# Patient Record
Sex: FEMALE | Race: WHITE | Employment: FULL TIME | URBAN - METROPOLITAN AREA
[De-identification: names, ages, dates, MRNs, and addresses within clinical notes are randomized per-mention and may not be internally consistent; named-entity substitution may affect disease eponyms.]

---

## 2017-11-20 PROBLEM — J30.89 CHRONIC NON-SEASONAL ALLERGIC RHINITIS: Status: ACTIVE | Noted: 2017-11-20

## 2018-06-28 ENCOUNTER — OFFICE VISIT (OUTPATIENT)
Dept: PRIMARY CARE CLINIC | Age: 48
End: 2018-06-28
Payer: COMMERCIAL

## 2018-06-28 VITALS
SYSTOLIC BLOOD PRESSURE: 128 MMHG | WEIGHT: 158 LBS | DIASTOLIC BLOOD PRESSURE: 80 MMHG | OXYGEN SATURATION: 97 % | TEMPERATURE: 97.9 F | HEART RATE: 83 BPM | BODY MASS INDEX: 24.75 KG/M2

## 2018-06-28 DIAGNOSIS — J01.20 ACUTE NON-RECURRENT ETHMOIDAL SINUSITIS: Primary | ICD-10-CM

## 2018-06-28 PROCEDURE — G8420 CALC BMI NORM PARAMETERS: HCPCS | Performed by: FAMILY MEDICINE

## 2018-06-28 PROCEDURE — 1036F TOBACCO NON-USER: CPT | Performed by: FAMILY MEDICINE

## 2018-06-28 PROCEDURE — G8427 DOCREV CUR MEDS BY ELIG CLIN: HCPCS | Performed by: FAMILY MEDICINE

## 2018-06-28 PROCEDURE — 99213 OFFICE O/P EST LOW 20 MIN: CPT | Performed by: FAMILY MEDICINE

## 2018-06-28 RX ORDER — AMOXICILLIN 500 MG/1
500 CAPSULE ORAL 3 TIMES DAILY
Qty: 30 CAPSULE | Refills: 0 | Status: SHIPPED | OUTPATIENT
Start: 2018-06-28 | End: 2018-07-08

## 2018-06-28 ASSESSMENT — ENCOUNTER SYMPTOMS
SWOLLEN GLANDS: 0
RHINORRHEA: 0
SINUS COMPLAINT: 1
SORE THROAT: 0
SINUS PAIN: 1
SHORTNESS OF BREATH: 0
COUGH: 1
SINUS PRESSURE: 1

## 2018-11-12 RX ORDER — MONTELUKAST SODIUM 10 MG/1
TABLET ORAL
Qty: 90 TABLET | Refills: 0 | Status: SHIPPED | OUTPATIENT
Start: 2018-11-12 | End: 2019-02-10 | Stop reason: SDUPTHER

## 2018-12-10 ENCOUNTER — OFFICE VISIT (OUTPATIENT)
Dept: PRIMARY CARE CLINIC | Age: 48
End: 2018-12-10
Payer: COMMERCIAL

## 2018-12-10 ENCOUNTER — HOSPITAL ENCOUNTER (OUTPATIENT)
Age: 48
Discharge: HOME | End: 2018-12-12
Payer: COMMERCIAL

## 2018-12-10 VITALS
HEIGHT: 68 IN | TEMPERATURE: 97 F | WEIGHT: 161 LBS | HEART RATE: 70 BPM | OXYGEN SATURATION: 99 % | SYSTOLIC BLOOD PRESSURE: 128 MMHG | BODY MASS INDEX: 24.4 KG/M2 | DIASTOLIC BLOOD PRESSURE: 80 MMHG

## 2018-12-10 DIAGNOSIS — Z00.00 ANNUAL PHYSICAL EXAM: Primary | ICD-10-CM

## 2018-12-10 DIAGNOSIS — S03.00XA DISLOCATION OF TEMPOROMANDIBULAR JOINT, INITIAL ENCOUNTER: ICD-10-CM

## 2018-12-10 DIAGNOSIS — Z00.00 ANNUAL PHYSICAL EXAM: ICD-10-CM

## 2018-12-10 DIAGNOSIS — F34.1 DYSTHYMIA: ICD-10-CM

## 2018-12-10 LAB
ALBUMIN SERPL-MCNC: 4.7 G/DL (ref 3.5–5.2)
ALP BLD-CCNC: 68 U/L (ref 35–104)
ALT SERPL-CCNC: 9 U/L (ref 0–32)
ANION GAP SERPL CALCULATED.3IONS-SCNC: 11 MMOL/L (ref 7–16)
AST SERPL-CCNC: 13 U/L (ref 0–31)
BILIRUB SERPL-MCNC: 0.4 MG/DL (ref 0–1.2)
BUN BLDV-MCNC: 17 MG/DL (ref 6–20)
CALCIUM SERPL-MCNC: 9.8 MG/DL (ref 8.6–10.2)
CHLORIDE BLD-SCNC: 99 MMOL/L (ref 98–107)
CHOLESTEROL, TOTAL: 202 MG/DL (ref 0–199)
CO2: 28 MMOL/L (ref 22–29)
CREAT SERPL-MCNC: 1 MG/DL (ref 0.5–1)
GFR AFRICAN AMERICAN: >60
GFR NON-AFRICAN AMERICAN: 59 ML/MIN/1.73
GLUCOSE BLD-MCNC: 95 MG/DL (ref 74–99)
HCT VFR BLD CALC: 43.9 % (ref 34–48)
HDLC SERPL-MCNC: 71 MG/DL
HEMOGLOBIN: 14.2 G/DL (ref 11.5–15.5)
LDL CHOLESTEROL CALCULATED: 121 MG/DL (ref 0–99)
MCH RBC QN AUTO: 28.4 PG (ref 26–35)
MCHC RBC AUTO-ENTMCNC: 32.3 % (ref 32–34.5)
MCV RBC AUTO: 87.8 FL (ref 80–99.9)
PDW BLD-RTO: 13 FL (ref 11.5–15)
PLATELET # BLD: 251 E9/L (ref 130–450)
PMV BLD AUTO: 10.4 FL (ref 7–12)
POTASSIUM SERPL-SCNC: 4.4 MMOL/L (ref 3.5–5)
RBC # BLD: 5 E12/L (ref 3.5–5.5)
SODIUM BLD-SCNC: 138 MMOL/L (ref 132–146)
TOTAL PROTEIN: 7.5 G/DL (ref 6.4–8.3)
TRIGL SERPL-MCNC: 48 MG/DL (ref 0–149)
VLDLC SERPL CALC-MCNC: 10 MG/DL
WBC # BLD: 5.9 E9/L (ref 4.5–11.5)

## 2018-12-10 PROCEDURE — 85027 COMPLETE CBC AUTOMATED: CPT

## 2018-12-10 PROCEDURE — 99396 PREV VISIT EST AGE 40-64: CPT | Performed by: FAMILY MEDICINE

## 2018-12-10 PROCEDURE — 80053 COMPREHEN METABOLIC PANEL: CPT

## 2018-12-10 PROCEDURE — G8484 FLU IMMUNIZE NO ADMIN: HCPCS | Performed by: FAMILY MEDICINE

## 2018-12-10 PROCEDURE — 80061 LIPID PANEL: CPT

## 2018-12-10 RX ORDER — CITALOPRAM 20 MG/1
20 TABLET ORAL DAILY
Qty: 30 TABLET | Refills: 1 | Status: SHIPPED | OUTPATIENT
Start: 2018-12-10 | End: 2019-02-04

## 2018-12-10 RX ORDER — CITALOPRAM 20 MG/1
20 TABLET ORAL DAILY
Qty: 30 TABLET | Refills: 1 | Status: SHIPPED | OUTPATIENT
Start: 2018-12-10 | End: 2018-12-10

## 2018-12-10 ASSESSMENT — PATIENT HEALTH QUESTIONNAIRE - PHQ9
SUM OF ALL RESPONSES TO PHQ9 QUESTIONS 1 & 2: 0
2. FEELING DOWN, DEPRESSED OR HOPELESS: 0
SUM OF ALL RESPONSES TO PHQ QUESTIONS 1-9: 0
SUM OF ALL RESPONSES TO PHQ QUESTIONS 1-9: 0
1. LITTLE INTEREST OR PLEASURE IN DOING THINGS: 0

## 2018-12-11 ENCOUNTER — TELEPHONE (OUTPATIENT)
Dept: PRIMARY CARE CLINIC | Age: 48
End: 2018-12-11

## 2019-01-10 ENCOUNTER — OFFICE VISIT (OUTPATIENT)
Dept: PRIMARY CARE CLINIC | Age: 49
End: 2019-01-10
Payer: COMMERCIAL

## 2019-01-10 VITALS
SYSTOLIC BLOOD PRESSURE: 124 MMHG | BODY MASS INDEX: 25.24 KG/M2 | TEMPERATURE: 98 F | WEIGHT: 166 LBS | OXYGEN SATURATION: 98 % | HEART RATE: 76 BPM | DIASTOLIC BLOOD PRESSURE: 82 MMHG

## 2019-01-10 DIAGNOSIS — F34.1 DYSTHYMIA: Primary | ICD-10-CM

## 2019-01-10 PROCEDURE — 99213 OFFICE O/P EST LOW 20 MIN: CPT | Performed by: FAMILY MEDICINE

## 2019-01-10 PROCEDURE — G8419 CALC BMI OUT NRM PARAM NOF/U: HCPCS | Performed by: FAMILY MEDICINE

## 2019-01-10 PROCEDURE — G8484 FLU IMMUNIZE NO ADMIN: HCPCS | Performed by: FAMILY MEDICINE

## 2019-01-10 PROCEDURE — 1036F TOBACCO NON-USER: CPT | Performed by: FAMILY MEDICINE

## 2019-01-10 PROCEDURE — G8427 DOCREV CUR MEDS BY ELIG CLIN: HCPCS | Performed by: FAMILY MEDICINE

## 2019-02-04 DIAGNOSIS — F34.1 DYSTHYMIA: ICD-10-CM

## 2019-02-04 RX ORDER — CITALOPRAM 20 MG/1
TABLET ORAL
Qty: 30 TABLET | Refills: 5 | Status: SHIPPED | OUTPATIENT
Start: 2019-02-04 | End: 2019-08-30 | Stop reason: SDUPTHER

## 2019-02-25 DIAGNOSIS — F41.9 ACUTE ANXIETY: Primary | ICD-10-CM

## 2019-02-25 RX ORDER — ALPRAZOLAM 0.5 MG/1
0.5 TABLET ORAL DAILY
Qty: 15 TABLET | Refills: 0 | OUTPATIENT
Start: 2019-02-25 | End: 2019-03-27

## 2019-02-26 RX ORDER — ALPRAZOLAM 0.5 MG/1
0.5 TABLET ORAL DAILY
Qty: 15 TABLET | Refills: 0 | Status: SHIPPED
Start: 2019-02-26 | End: 2020-10-19

## 2019-03-28 RX ORDER — BUPROPION HYDROCHLORIDE 300 MG/1
TABLET ORAL
Qty: 90 TABLET | Refills: 0 | Status: SHIPPED | OUTPATIENT
Start: 2019-03-28 | End: 2019-07-09 | Stop reason: SDUPTHER

## 2019-03-28 RX ORDER — MONTELUKAST SODIUM 10 MG/1
TABLET ORAL
Qty: 90 TABLET | Refills: 1 | Status: SHIPPED | OUTPATIENT
Start: 2019-03-28 | End: 2019-10-29 | Stop reason: SDUPTHER

## 2019-07-10 RX ORDER — BUPROPION HYDROCHLORIDE 300 MG/1
TABLET ORAL
Qty: 90 TABLET | Refills: 1 | Status: SHIPPED
Start: 2019-07-10 | End: 2020-02-17

## 2019-12-17 ENCOUNTER — HOSPITAL ENCOUNTER (OUTPATIENT)
Age: 49
Discharge: HOME | End: 2019-12-19
Payer: COMMERCIAL

## 2019-12-17 ENCOUNTER — OFFICE VISIT (OUTPATIENT)
Dept: PRIMARY CARE CLINIC | Age: 49
End: 2019-12-17
Payer: COMMERCIAL

## 2019-12-17 VITALS
OXYGEN SATURATION: 99 % | BODY MASS INDEX: 27.99 KG/M2 | HEART RATE: 75 BPM | DIASTOLIC BLOOD PRESSURE: 74 MMHG | SYSTOLIC BLOOD PRESSURE: 118 MMHG | HEIGHT: 69 IN | TEMPERATURE: 97 F | WEIGHT: 189 LBS

## 2019-12-17 DIAGNOSIS — R53.83 FATIGUE, UNSPECIFIED TYPE: ICD-10-CM

## 2019-12-17 DIAGNOSIS — Z00.00 ANNUAL PHYSICAL EXAM: Primary | ICD-10-CM

## 2019-12-17 DIAGNOSIS — Z00.00 ANNUAL PHYSICAL EXAM: ICD-10-CM

## 2019-12-17 LAB
ALBUMIN SERPL-MCNC: 4.4 G/DL (ref 3.5–5.2)
ALP BLD-CCNC: 88 U/L (ref 35–104)
ALT SERPL-CCNC: 21 U/L (ref 0–32)
ANION GAP SERPL CALCULATED.3IONS-SCNC: 14 MMOL/L (ref 7–16)
APPEARANCE FLUID: NORMAL
AST SERPL-CCNC: 30 U/L (ref 0–31)
BILIRUB SERPL-MCNC: 0.4 MG/DL (ref 0–1.2)
BILIRUBIN, POC: NORMAL
BLOOD URINE, POC: NORMAL
BUN BLDV-MCNC: 12 MG/DL (ref 6–20)
CALCIUM SERPL-MCNC: 10.1 MG/DL (ref 8.6–10.2)
CHLORIDE BLD-SCNC: 98 MMOL/L (ref 98–107)
CHOLESTEROL, TOTAL: 213 MG/DL (ref 0–199)
CLARITY, POC: NORMAL
CO2: 26 MMOL/L (ref 22–29)
COLOR, POC: YELLOW
CREAT SERPL-MCNC: 0.9 MG/DL (ref 0.5–1)
GFR AFRICAN AMERICAN: >60
GFR NON-AFRICAN AMERICAN: >60 ML/MIN/1.73
GLUCOSE BLD-MCNC: 103 MG/DL (ref 74–99)
GLUCOSE URINE, POC: NORMAL
HCT VFR BLD CALC: 46.5 % (ref 34–48)
HDLC SERPL-MCNC: 64 MG/DL
HEMOGLOBIN: 14.7 G/DL (ref 11.5–15.5)
KETONES, POC: NORMAL
LDL CHOLESTEROL CALCULATED: 131 MG/DL (ref 0–99)
LEUKOCYTE EST, POC: NORMAL
MCH RBC QN AUTO: 27.7 PG (ref 26–35)
MCHC RBC AUTO-ENTMCNC: 31.6 % (ref 32–34.5)
MCV RBC AUTO: 87.6 FL (ref 80–99.9)
NITRITE, POC: NORMAL
PDW BLD-RTO: 13 FL (ref 11.5–15)
PH, POC: 7
PLATELET # BLD: 260 E9/L (ref 130–450)
PMV BLD AUTO: 10.7 FL (ref 7–12)
POTASSIUM SERPL-SCNC: 4.3 MMOL/L (ref 3.5–5)
PROTEIN, POC: NORMAL
RBC # BLD: 5.31 E12/L (ref 3.5–5.5)
SODIUM BLD-SCNC: 138 MMOL/L (ref 132–146)
SPECIFIC GRAVITY, POC: 1.01
TOTAL PROTEIN: 7.5 G/DL (ref 6.4–8.3)
TRIGL SERPL-MCNC: 91 MG/DL (ref 0–149)
TSH SERPL DL<=0.05 MIU/L-ACNC: 1.85 UIU/ML (ref 0.27–4.2)
UROBILINOGEN, POC: 0.2
VLDLC SERPL CALC-MCNC: 18 MG/DL
WBC # BLD: 6 E9/L (ref 4.5–11.5)

## 2019-12-17 PROCEDURE — 84443 ASSAY THYROID STIM HORMONE: CPT

## 2019-12-17 PROCEDURE — 80053 COMPREHEN METABOLIC PANEL: CPT

## 2019-12-17 PROCEDURE — 80061 LIPID PANEL: CPT

## 2019-12-17 PROCEDURE — G8484 FLU IMMUNIZE NO ADMIN: HCPCS | Performed by: FAMILY MEDICINE

## 2019-12-17 PROCEDURE — 81002 URINALYSIS NONAUTO W/O SCOPE: CPT | Performed by: FAMILY MEDICINE

## 2019-12-17 PROCEDURE — 99396 PREV VISIT EST AGE 40-64: CPT | Performed by: FAMILY MEDICINE

## 2019-12-17 PROCEDURE — 85027 COMPLETE CBC AUTOMATED: CPT

## 2020-01-10 ENCOUNTER — HOSPITAL ENCOUNTER (OUTPATIENT)
Age: 50
Discharge: HOME | End: 2020-01-12
Payer: COMMERCIAL

## 2020-01-10 ENCOUNTER — OFFICE VISIT (OUTPATIENT)
Dept: PRIMARY CARE CLINIC | Age: 50
End: 2020-01-10
Payer: COMMERCIAL

## 2020-01-10 VITALS
TEMPERATURE: 97.3 F | DIASTOLIC BLOOD PRESSURE: 80 MMHG | HEART RATE: 83 BPM | OXYGEN SATURATION: 97 % | WEIGHT: 190 LBS | SYSTOLIC BLOOD PRESSURE: 128 MMHG | BODY MASS INDEX: 28.06 KG/M2

## 2020-01-10 LAB
FOLATE: 15.8 NG/ML (ref 4.8–24.2)
VITAMIN B-12: 328 PG/ML (ref 211–946)
VITAMIN D 25-HYDROXY: 23 NG/ML (ref 30–100)

## 2020-01-10 PROCEDURE — 82746 ASSAY OF FOLIC ACID SERUM: CPT

## 2020-01-10 PROCEDURE — G8484 FLU IMMUNIZE NO ADMIN: HCPCS | Performed by: FAMILY MEDICINE

## 2020-01-10 PROCEDURE — 82607 VITAMIN B-12: CPT

## 2020-01-10 PROCEDURE — 99213 OFFICE O/P EST LOW 20 MIN: CPT | Performed by: FAMILY MEDICINE

## 2020-01-10 PROCEDURE — 96372 THER/PROPH/DIAG INJ SC/IM: CPT | Performed by: FAMILY MEDICINE

## 2020-01-10 PROCEDURE — 82306 VITAMIN D 25 HYDROXY: CPT

## 2020-01-10 PROCEDURE — 1036F TOBACCO NON-USER: CPT | Performed by: FAMILY MEDICINE

## 2020-01-10 PROCEDURE — G8427 DOCREV CUR MEDS BY ELIG CLIN: HCPCS | Performed by: FAMILY MEDICINE

## 2020-01-10 PROCEDURE — G8419 CALC BMI OUT NRM PARAM NOF/U: HCPCS | Performed by: FAMILY MEDICINE

## 2020-01-10 RX ORDER — CYANOCOBALAMIN 1000 UG/ML
1000 INJECTION INTRAMUSCULAR; SUBCUTANEOUS ONCE
Status: COMPLETED | OUTPATIENT
Start: 2020-01-10 | End: 2020-01-10

## 2020-01-10 RX ADMIN — CYANOCOBALAMIN 1000 MCG: 1000 INJECTION INTRAMUSCULAR; SUBCUTANEOUS at 16:39

## 2020-01-10 ASSESSMENT — PATIENT HEALTH QUESTIONNAIRE - PHQ9
2. FEELING DOWN, DEPRESSED OR HOPELESS: 0
SUM OF ALL RESPONSES TO PHQ QUESTIONS 1-9: 0
SUM OF ALL RESPONSES TO PHQ QUESTIONS 1-9: 0
SUM OF ALL RESPONSES TO PHQ9 QUESTIONS 1 & 2: 0
1. LITTLE INTEREST OR PLEASURE IN DOING THINGS: 0

## 2020-01-10 ASSESSMENT — ENCOUNTER SYMPTOMS
COUGH: 0
SORE THROAT: 0

## 2020-01-10 NOTE — PROGRESS NOTES
signs reviewed. Eyes:      General: No scleral icterus. Conjunctiva/sclera: Conjunctivae normal.   Neck:      Musculoskeletal: Neck supple. Thyroid: No thyromegaly. Vascular: No carotid bruit. Cardiovascular:      Rate and Rhythm: Normal rate and regular rhythm. Heart sounds: No murmur. Pulmonary:      Effort: Pulmonary effort is normal.      Breath sounds: Normal breath sounds. No wheezing or rales. Abdominal:      General: Bowel sounds are normal.      Palpations: Abdomen is soft. There is no mass. Tenderness: There is no tenderness. There is no guarding or rebound. Musculoskeletal: Normal range of motion. Lymphadenopathy:      Cervical: No cervical adenopathy. Skin:     General: Skin is warm and dry. Neurological:      Mental Status: She is alert and oriented to person, place, and time. Psychiatric:         Mood and Affect: Mood normal.         ASSESSMENT AND PLAN:    Vanessa Pedro was seen today for fatigue. Diagnoses and all orders for this visit:    Fatigue, unspecified type  -     Vitamin D 25 Hydroxy; Future  -     VITAMIN B12 & FOLATE; Future  -     NV VITAMIN B12 INJECTION  -     cyanocobalamin injection 1,000 mcg        Return for based on lab results.     Wayne Harrison,

## 2020-01-14 ENCOUNTER — TELEPHONE (OUTPATIENT)
Dept: PRIMARY CARE CLINIC | Age: 50
End: 2020-01-14

## 2020-01-14 NOTE — TELEPHONE ENCOUNTER
Called patient vitamin D level low at 23. Therefore recommend over-the-counter vitamin D 2000 units daily to see if this helps with her fatigue. She stated the vitamin B12 shot did seem to help over the last several days where she did not feel like she needed to come home and take a nap. Follow-up as needed.

## 2020-02-17 RX ORDER — MONTELUKAST SODIUM 10 MG/1
TABLET ORAL
Qty: 90 TABLET | Refills: 1 | Status: SHIPPED
Start: 2020-02-17 | End: 2020-05-26

## 2020-02-17 RX ORDER — BUPROPION HYDROCHLORIDE 300 MG/1
TABLET ORAL
Qty: 90 TABLET | Refills: 1 | Status: SHIPPED
Start: 2020-02-17 | End: 2020-05-26

## 2020-05-26 RX ORDER — BUPROPION HYDROCHLORIDE 300 MG/1
TABLET ORAL
Qty: 90 TABLET | Refills: 1 | Status: SHIPPED
Start: 2020-05-26 | End: 2020-08-13

## 2020-05-26 RX ORDER — MONTELUKAST SODIUM 10 MG/1
TABLET ORAL
Qty: 90 TABLET | Refills: 1 | Status: SHIPPED
Start: 2020-05-26 | End: 2020-08-13

## 2020-12-07 ENCOUNTER — OFFICE VISIT (OUTPATIENT)
Dept: PRIMARY CARE CLINIC | Age: 50
End: 2020-12-07
Payer: COMMERCIAL

## 2020-12-07 VITALS
WEIGHT: 175 LBS | SYSTOLIC BLOOD PRESSURE: 110 MMHG | BODY MASS INDEX: 25.92 KG/M2 | HEIGHT: 69 IN | HEART RATE: 71 BPM | OXYGEN SATURATION: 97 % | DIASTOLIC BLOOD PRESSURE: 80 MMHG | TEMPERATURE: 97.2 F

## 2020-12-07 DIAGNOSIS — Z00.00 ANNUAL PHYSICAL EXAM: ICD-10-CM

## 2020-12-07 PROBLEM — F34.1 DYSTHYMIA: Status: ACTIVE | Noted: 2020-12-07

## 2020-12-07 LAB
ALBUMIN SERPL-MCNC: 4.6 G/DL (ref 3.5–5.2)
ALP BLD-CCNC: 87 U/L (ref 35–104)
ALT SERPL-CCNC: 9 U/L (ref 0–32)
ANION GAP SERPL CALCULATED.3IONS-SCNC: 7 MMOL/L (ref 7–16)
AST SERPL-CCNC: 17 U/L (ref 0–31)
BILIRUB SERPL-MCNC: 0.4 MG/DL (ref 0–1.2)
BUN BLDV-MCNC: 17 MG/DL (ref 6–20)
CALCIUM SERPL-MCNC: 9.4 MG/DL (ref 8.6–10.2)
CHLORIDE BLD-SCNC: 102 MMOL/L (ref 98–107)
CHOLESTEROL, TOTAL: 209 MG/DL (ref 0–199)
CO2: 30 MMOL/L (ref 22–29)
CREAT SERPL-MCNC: 1 MG/DL (ref 0.5–1)
GFR AFRICAN AMERICAN: >60
GFR NON-AFRICAN AMERICAN: 59 ML/MIN/1.73
GLUCOSE BLD-MCNC: 90 MG/DL (ref 74–99)
HCT VFR BLD CALC: 40.4 % (ref 34–48)
HDLC SERPL-MCNC: 57 MG/DL
HEMOGLOBIN: 12.2 G/DL (ref 11.5–15.5)
LDL CHOLESTEROL CALCULATED: 137 MG/DL (ref 0–99)
MCH RBC QN AUTO: 24.8 PG (ref 26–35)
MCHC RBC AUTO-ENTMCNC: 30.2 % (ref 32–34.5)
MCV RBC AUTO: 82.1 FL (ref 80–99.9)
PDW BLD-RTO: 14.6 FL (ref 11.5–15)
PLATELET # BLD: 269 E9/L (ref 130–450)
PMV BLD AUTO: 10.7 FL (ref 7–12)
POTASSIUM SERPL-SCNC: 4.1 MMOL/L (ref 3.5–5)
RBC # BLD: 4.92 E12/L (ref 3.5–5.5)
SODIUM BLD-SCNC: 139 MMOL/L (ref 132–146)
TOTAL PROTEIN: 7.4 G/DL (ref 6.4–8.3)
TRIGL SERPL-MCNC: 74 MG/DL (ref 0–149)
VLDLC SERPL CALC-MCNC: 15 MG/DL
WBC # BLD: 5.5 E9/L (ref 4.5–11.5)

## 2020-12-07 PROCEDURE — G8484 FLU IMMUNIZE NO ADMIN: HCPCS | Performed by: FAMILY MEDICINE

## 2020-12-07 PROCEDURE — 99396 PREV VISIT EST AGE 40-64: CPT | Performed by: FAMILY MEDICINE

## 2020-12-07 RX ORDER — CITALOPRAM 20 MG/1
TABLET ORAL
Qty: 30 TABLET | Refills: 5 | Status: SHIPPED
Start: 2020-12-07 | End: 2021-06-18

## 2020-12-07 NOTE — PROGRESS NOTES
SUBJECTIVE:    HPI: Susan Hines  Was seen here today for   Chief Complaint   Patient presents with    Annual Exam     yearly    Blood Work     drawn place orders    . She states they are dealing with no new issues. Past Medical History:   Diagnosis Date    Depression     Hyperlipidemia        Past Surgical History:   Procedure Laterality Date    TONSILLECTOMY         Family History   Problem Relation Age of Onset    Breast Cancer Maternal Grandmother     Other Father         MULTIPLE MYELOMA    Migraines Mother     Hypertension Brother     Diabetes Brother        Prior to Admission medications    Medication Sig Start Date End Date Taking? Authorizing Provider   ALPRAZolam Alvia Dee) 0.5 MG tablet Take 1 tablet by mouth daily as needed for Anxiety for up to 92 days. 10/19/20 1/19/21 Yes Ahmet Harrison, DO   montelukast (SINGULAIR) 10 MG tablet take 1 tablet by mouth NIGHTLY 8/13/20  Yes Ahmet Harrison DO   buPROPion (WELLBUTRIN XL) 300 MG extended release tablet take 1 tablet by mouth every morning 8/13/20  Yes Ahmet Harrison DO   citalopram (CELEXA) 20 MG tablet take 1 tablet by mouth once daily 4/24/20  Yes Neema Harrison, DO   QUERCETIN PO Take by mouth 2 times daily   Yes Historical Provider, MD   Cetirizine HCl (ZYRTEC PO) Take by mouth   Yes Historical Provider, MD   vitamin E 1000 units capsule Take 1,000 Units by mouth daily   Yes Historical Provider, MD   levonorgestrel (MIRENA) IUD 52 mg 1 each by Intrauterine route once   Yes Historical Provider, MD   Probiotic Product (PROBIOTIC-10) CAPS Take by mouth   Yes Historical Provider, MD   Multiple Vitamins-Minerals (Zygmunt Ivans) CHEW Take by mouth   Yes Historical Provider, MD   BLACK COHOSH PO Take by mouth   Yes Historical Provider, MD   fluticasone (FLONASE) 50 MCG/ACT nasal spray 1 spray by Nasal route daily   Yes Historical Provider, MD       Patient has no known allergies.     Social History     Tobacco Use    Smoking status: Never Smoker    Smokeless tobacco: Never Used   Substance Use Topics    Alcohol use: No         Review Of Systems:    Skin: no abnormal pigmentation, rash, scaling, itching, masses, hair or nail changes  Eyes: no blurring, diplopia, or eye pain  Ears/Nose/Throat: no hearing loss, tinnitus, vertigo, nosebleed, nasal congestion, rhinorrhea, sore throat. allergies doing ok. Respiratory: no cough, pleuritic chest pain, dyspnea, or wheezing  Cardiovascular: no chest pain, angina, dyspnea on exertion, orthopnea, PND, palpitations, or claudication  Gastrointestinal: no nausea, vomiting, heartburn, diarrhea, constipation, bloating,  abdominal pain, or blood per rectum  Genitourinary: no urinary urgency, frequency, dysuria, nocturia, hesitancy, or incontinence  Musculoskeletal: no arthritis, arthralgia, myalgia, weakness, or morning stiffness  Neurologic: no paralysis, paresis, paresthesia, seizures, tremors, or headaches  Hematologic/Lymphatic/Immunologic: no anemia, abnormal bleeding/bruising, fever, chills, night sweats, swollen glands, or unexplained weight loss  Endocrine: no heat or cold intolerance and no polyphagia, polydipsia, or polyuria  Moods: doing ok with meds. Recently needed some Xanax for an acute anxiety attack. Also uses otc herbal med prn which helps acute anxiety. OBJECTIVE:    VS: /80   Pulse 71   Temp 97.2 °F (36.2 °C)   Ht 5' 9\" (1.753 m)   Wt 175 lb (79.4 kg)   SpO2 97%   BMI 25.84 kg/m²   General appearance: Alert, Awake, Oriented times 3, no distress  Skin: Warm and dry  Head: Normocephalic. No masses, lesions or tenderness noted  Eyes: Conjunctivae appear normal. PERRL  Ears: External ears normal, TM's clear and intact  Nose/Sinuses: Nares normal. Septum midline. Mucosa normal. No drainage  Oropharynx: Oropharynx clear with no exudate seen  Neck: Neck supple. No jugular venous distension, lymphadenopathy or thyromegaly Trachea midline.  No carotid bruits  Lungs: Lungs clear to auscultation bilaterally. No rhonchi, crackles or wheezes  Heart: S1 S2  Regular rate and rhythm. No rub, murmur or gallop  Abdomen: Abdomen soft, non-tender. BS normal. No masses, organomegaly  Extremities: No edema, Peripheral pulses palpable  Musculoskeletal: Muscular strength appears intact. No joint effusion, tenderness, swelling or warmth  Neuro:  No focal motor or sensory deficits. 2+/4 L4 reflexes        ASSESSMENT/PLAN:  Amanda Hall was seen today for annual exam and blood work. Diagnoses and all orders for this visit:    Annual physical exam  -     Lipid Panel; Future  -     CBC; Future  -     Comprehensive Metabolic Panel; Future    Dysthymia     - form for work filled out. - continue current meds    Return in about 1 year (around 12/7/2021) for or for acute problem.     Marybeth Harrison, DO

## 2020-12-09 ENCOUNTER — TELEPHONE (OUTPATIENT)
Dept: PRIMARY CARE CLINIC | Age: 50
End: 2020-12-09

## 2020-12-09 NOTE — TELEPHONE ENCOUNTER
Called patient left message cholesterol slightly elevated but has a 0.9% 10-year risk of ASCVD so no medication needed. Just try to follow a low-cholesterol diet. Remainder of labs good.

## 2021-04-23 ENCOUNTER — OFFICE VISIT (OUTPATIENT)
Dept: FAMILY MEDICINE CLINIC | Age: 51
End: 2021-04-23
Payer: COMMERCIAL

## 2021-04-23 VITALS
TEMPERATURE: 97.5 F | BODY MASS INDEX: 27.28 KG/M2 | RESPIRATION RATE: 18 BRPM | SYSTOLIC BLOOD PRESSURE: 124 MMHG | DIASTOLIC BLOOD PRESSURE: 76 MMHG | HEIGHT: 68 IN | WEIGHT: 180 LBS | OXYGEN SATURATION: 98 % | HEART RATE: 86 BPM

## 2021-04-23 DIAGNOSIS — N39.0 URINARY TRACT INFECTION WITHOUT HEMATURIA, SITE UNSPECIFIED: ICD-10-CM

## 2021-04-23 DIAGNOSIS — R42 DIZZINESS: ICD-10-CM

## 2021-04-23 DIAGNOSIS — R53.83 FATIGUE, UNSPECIFIED TYPE: Primary | ICD-10-CM

## 2021-04-23 DIAGNOSIS — R53.83 FATIGUE, UNSPECIFIED TYPE: ICD-10-CM

## 2021-04-23 LAB
BILIRUBIN, POC: NEGATIVE
BLOOD URINE, POC: NEGATIVE
CLARITY, POC: CLEAR
COLOR, POC: YELLOW
GLUCOSE URINE, POC: NEGATIVE
INFLUENZA A ANTIBODY: NEGATIVE
INFLUENZA B ANTIBODY: NEGATIVE
KETONES, POC: NEGATIVE
LEUKOCYTE EST, POC: NORMAL
Lab: NORMAL
NITRITE, POC: NEGATIVE
PERFORMING INSTRUMENT: NORMAL
PH, POC: 5.5
PROTEIN, POC: NEGATIVE
QC PASS/FAIL: NORMAL
SARS-COV-2, POC: NORMAL
SPECIFIC GRAVITY, POC: 1.01
UROBILINOGEN, POC: 0.2

## 2021-04-23 PROCEDURE — 87804 INFLUENZA ASSAY W/OPTIC: CPT | Performed by: PHYSICIAN ASSISTANT

## 2021-04-23 PROCEDURE — 87426 SARSCOV CORONAVIRUS AG IA: CPT | Performed by: PHYSICIAN ASSISTANT

## 2021-04-23 PROCEDURE — 1036F TOBACCO NON-USER: CPT | Performed by: PHYSICIAN ASSISTANT

## 2021-04-23 PROCEDURE — 99203 OFFICE O/P NEW LOW 30 MIN: CPT | Performed by: PHYSICIAN ASSISTANT

## 2021-04-23 PROCEDURE — G8427 DOCREV CUR MEDS BY ELIG CLIN: HCPCS | Performed by: PHYSICIAN ASSISTANT

## 2021-04-23 PROCEDURE — G8419 CALC BMI OUT NRM PARAM NOF/U: HCPCS | Performed by: PHYSICIAN ASSISTANT

## 2021-04-23 PROCEDURE — 81002 URINALYSIS NONAUTO W/O SCOPE: CPT | Performed by: PHYSICIAN ASSISTANT

## 2021-04-23 RX ORDER — CEFDINIR 300 MG/1
300 CAPSULE ORAL 2 TIMES DAILY
Qty: 20 CAPSULE | Refills: 0 | Status: SHIPPED | OUTPATIENT
Start: 2021-04-23 | End: 2021-05-03

## 2021-04-23 NOTE — PROGRESS NOTES
21  William Mora : 1970 Sex: female  Age 53 y.o. Subjective:  Chief Complaint   Patient presents with    Dizziness     since tuesday          HPI:   William Mora , 53 y.o. female presents to express care for evaluation of dizziness, lightheadedness, shortness of breath, fatigue. The patient has had the symptoms ongoing since Tuesday. They seem to wax and wane. The patient is not dizzy all the time. It can occur at rest or with exertion. The patient describes it more as a near syncopal.  The patient really is not having any room spinning. The patient denies any lateralizing weakness of the upper or lower extremities. She is not having any chest pain. The patient states some shortness of breath intermittently. She is not having any upper respiratory symptoms. The patient denies any fevers. She is not having any specific urinary tract symptoms. States that she has been eating and drinking normally. No real changes in her medication. ROS:   Unless otherwise stated in this report the patient's positive and negative responses for review of systems for constitutional, eyes, ENT, cardiovascular, respiratory, gastrointestinal, neurological, , musculoskeletal, and integument systems and related systems to the presenting problem are either stated in the history of present illness or were not pertinent or were negative for the symptoms and/or complaints related to the presenting medical problem. Positives and pertinent negatives as per HPI. All others reviewed and are negative.       PMH:     Past Medical History:   Diagnosis Date    Allergic rhinitis     Depression     Hyperlipidemia        Past Surgical History:   Procedure Laterality Date    TONSILLECTOMY         Family History   Problem Relation Age of Onset    Breast Cancer Maternal Grandmother     Other Father         MULTIPLE MYELOMA    Migraines Mother     Hypertension Brother     Diabetes Brother Medications:     Current Outpatient Medications:     cefdinir (OMNICEF) 300 MG capsule, Take 1 capsule by mouth 2 times daily for 10 days, Disp: 20 capsule, Rfl: 0    citalopram (CELEXA) 20 MG tablet, take 1 tablet by mouth once daily, Disp: 30 tablet, Rfl: 5    montelukast (SINGULAIR) 10 MG tablet, take 1 tablet by mouth NIGHTLY, Disp: 90 tablet, Rfl: 0    buPROPion (WELLBUTRIN XL) 300 MG extended release tablet, take 1 tablet by mouth every morning, Disp: 90 tablet, Rfl: 0    Cetirizine HCl (ZYRTEC PO), Take by mouth, Disp: , Rfl:     vitamin E 1000 units capsule, Take 1,000 Units by mouth daily, Disp: , Rfl:     levonorgestrel (MIRENA) IUD 52 mg, 1 each by Intrauterine route once, Disp: , Rfl:     Probiotic Product (PROBIOTIC-10) CAPS, Take by mouth, Disp: , Rfl:     Multiple Vitamins-Minerals (MULTIVITAL) CHEW, Take by mouth, Disp: , Rfl:     BLACK COHOSH PO, Take by mouth, Disp: , Rfl:     fluticasone (FLONASE) 50 MCG/ACT nasal spray, 1 spray by Nasal route daily, Disp: , Rfl:     Allergies:   No Known Allergies    Social History:     Social History     Tobacco Use    Smoking status: Never Smoker    Smokeless tobacco: Never Used   Substance Use Topics    Alcohol use: No    Drug use: No       Patient lives at home. Physical Exam:     Vitals:    04/23/21 0810   BP: 124/76   Pulse: 86   Resp: 18   Temp: 97.5 °F (36.4 °C)   SpO2: 98%   Weight: 180 lb (81.6 kg)   Height: 5' 8\" (1.727 m)       Exam:  Physical Exam  Nurse's notes and vital signs reviewed. The patient is not hypoxic. General: Alert, no acute distress, patient resting comfortably Patient is not toxic or lethargic. Skin: Warm, intact, no pallor noted. There is no evidence of rash at this time. Head: Normocephalic, atraumatic. Eye: Normal conjunctiva  Ears, Nose, Throat: Right tympanic membrane clear, left tympanic membrane clear. No drainage or discharge noted. No pre- or post-auricular tenderness, erythema, or swelling noted.    No rhinorrhea or congestion noted. No facial erythema. Posterior oropharynx shows no erythema, tonsillar hypertrophy, asymmetry or peritonsillar abscess and no evidence of exudate. the uvula is midline. No trismus or drooling is noted. Moist mucous membranes. Neck: No anterior/posterior lymphadenopathy noted. No erythema, no masses, no fluctuance or induration noted. No meningeal signs. Cardio: Regular Rate and Rhythm  Respiratory: No acute distress, no rhonchi, wheezing or crackles noted. No stridor or retractions are noted. Abdomen: Normal bowel sounds, soft, nontender, no masses detected. No rebound, guarding, or rigidity noted. Musculoskeletal: No obvious deformity, no edema, no calf tenderness. No erythema. Neurological: A&O x4, normal speech, normal motor, normal sensory, normal equal  strength, normal gait, no ataxia  Psychiatric: Cooperative         Testing:     Results for orders placed or performed in visit on 04/23/21   POCT Urinalysis no Micro   Result Value Ref Range    Color, UA yellow     Clarity, UA clear     Glucose, UA POC negative     Bilirubin, UA negative     Ketones, UA negative     Spec Grav, UA 1.010     Blood, UA POC negative     pH, UA 5.5     Protein, UA POC negative     Urobilinogen, UA 0.2     Leukocytes, UA large     Nitrite, UA negative    POCT Influenza A/B   Result Value Ref Range    Influenza A Ab negative     Influenza B Ab negative    POCT COVID-19, Antigen   Result Value Ref Range    SARS-COV-2, POC Not-Detected Not Detected    Lot Number [id number]     QC Pass/Fail pass     Performing Instrument BD Veritor            Medical Decision Making:     Vital signs reviewed    Past medical history reviewed. Allergies reviewed. Medications reviewed. Patient on arrival does not appear to be in any apparent distress or discomfort. The patient has been seen and evaluated. The patient does not appear to be toxic or lethargic.   The patient had vital signs that were all noted to be within normal limits. The patient will have Covid, influenza and urinalysis performed. Covid was negative    Influenza negative    Urinalysis did show evidence of large leukocytes. The patient will have a urine culture set up. We will treat the patient for a urinary tract infection with cefdinir. The patient is to push fluids and get plenty of rest.  The patient is to return if any of the signs or symptoms worsen. We will update the patient with the results of the urine culture. The patient was educated on the proper dosage of motrin and tylenol and the appropriate intervals of each. The patient is to increase fluid intake over the next several days. The patient is to use OTC decongestant as needed. The patient is to return to express care or go directly to the emergency department should any of the signs or symptoms worsen. The patient is to followup with primary care physician in 2-3 days for repeat evaluation. The patient has no other questions or concerns at this time the patient will be discharged home. Clinical Impression:   Siri Heredia was seen today for dizziness. Diagnoses and all orders for this visit:    Fatigue, unspecified type  -     POCT Urinalysis no Micro  -     POCT Influenza A/B  -     POCT COVID-19, Antigen  -     Culture, Urine; Future    Urinary tract infection without hematuria, site unspecified  -     Culture, Urine; Future    Dizziness    Other orders  -     cefdinir (OMNICEF) 300 MG capsule; Take 1 capsule by mouth 2 times daily for 10 days        The patient is to call for any concerns or return if any of the signs or symptoms worsen. The patient is to follow-up with PCP in the next 2-3 days for repeat evaluation repeat assessment or go directly to the emergency department.      SIGNATURE: Kristofer Katz III, PA-C

## 2021-04-26 LAB
ORGANISM: ABNORMAL
URINE CULTURE, ROUTINE: ABNORMAL
URINE CULTURE, ROUTINE: ABNORMAL

## 2021-04-26 RX ORDER — NITROFURANTOIN 25; 75 MG/1; MG/1
100 CAPSULE ORAL 2 TIMES DAILY
Qty: 20 CAPSULE | Refills: 0 | Status: SHIPPED
Start: 2021-04-26 | End: 2021-04-27

## 2021-04-27 ENCOUNTER — VIRTUAL VISIT (OUTPATIENT)
Dept: PRIMARY CARE CLINIC | Age: 51
End: 2021-04-27
Payer: COMMERCIAL

## 2021-04-27 DIAGNOSIS — R42 DIZZINESS: Primary | ICD-10-CM

## 2021-04-27 PROCEDURE — 99213 OFFICE O/P EST LOW 20 MIN: CPT | Performed by: FAMILY MEDICINE

## 2021-04-27 PROCEDURE — G8427 DOCREV CUR MEDS BY ELIG CLIN: HCPCS | Performed by: FAMILY MEDICINE

## 2021-04-27 PROCEDURE — G8419 CALC BMI OUT NRM PARAM NOF/U: HCPCS | Performed by: FAMILY MEDICINE

## 2021-04-27 PROCEDURE — 1036F TOBACCO NON-USER: CPT | Performed by: FAMILY MEDICINE

## 2021-04-27 RX ORDER — MECLIZINE HCL 12.5 MG/1
12.5 TABLET ORAL 3 TIMES DAILY PRN
Qty: 15 TABLET | Refills: 0 | Status: SHIPPED | OUTPATIENT
Start: 2021-04-27 | End: 2021-05-07

## 2021-04-27 ASSESSMENT — ENCOUNTER SYMPTOMS
SHORTNESS OF BREATH: 1
RHINORRHEA: 0
SINUS PAIN: 0
SORE THROAT: 0
COUGH: 0
VISUAL CHANGE: 0

## 2021-04-27 NOTE — PROGRESS NOTES
Mya Zavala, a female of 53 y.o.did a virtual visit on 4/27/2021. Patient Active Problem List   Diagnosis    Chronic non-seasonal allergic rhinitis    Dysthymia          Dizziness  This is a new problem. The current episode started in the past 7 days (1 wk.). Associated symptoms include chills. Pertinent negatives include no congestion, coughing, fever, headaches, sore throat or visual change. Associated symptoms comments: No loss of smell or taste. . Exacerbated by: possibly 2 days after cutting lawn. She has tried nothing for the symptoms. - put on Omnicef 4/23 for possible uti. No Known Allergies    Current Outpatient Medications on File Prior to Visit   Medication Sig Dispense Refill    cefdinir (OMNICEF) 300 MG capsule Take 1 capsule by mouth 2 times daily for 10 days 20 capsule 0    citalopram (CELEXA) 20 MG tablet take 1 tablet by mouth once daily 30 tablet 5    montelukast (SINGULAIR) 10 MG tablet take 1 tablet by mouth NIGHTLY 90 tablet 0    buPROPion (WELLBUTRIN XL) 300 MG extended release tablet take 1 tablet by mouth every morning 90 tablet 0    Cetirizine HCl (ZYRTEC PO) Take by mouth      vitamin E 1000 units capsule Take 1,000 Units by mouth daily      levonorgestrel (MIRENA) IUD 52 mg 1 each by Intrauterine route once      Probiotic Product (PROBIOTIC-10) CAPS Take by mouth      Multiple Vitamins-Minerals (MULTIVITAL) CHEW Take by mouth      BLACK COHOSH PO Take by mouth      fluticasone (FLONASE) 50 MCG/ACT nasal spray 1 spray by Nasal route daily       No current facility-administered medications on file prior to visit. Review of Systems   Constitutional: Positive for chills. Negative for fever. HENT: Negative for congestion, rhinorrhea, sinus pain and sore throat. Respiratory: Positive for shortness of breath. Negative for cough. Neurological: Positive for dizziness and light-headedness. Negative for headaches.    other review of systems reviewed and are negative    OBJECTIVE:  There were no vitals taken for this visit. Physical Exam  Constitutional:       General: She is not in acute distress. Appearance: Normal appearance. She is not ill-appearing, toxic-appearing or diaphoretic. HENT:      Head: Normocephalic. Eyes:      General: No scleral icterus. Pulmonary:      Effort: Pulmonary effort is normal.   Neurological:      Mental Status: She is alert and oriented to person, place, and time. Psychiatric:         Mood and Affect: Mood normal.         ASSESSMENT AND PLAN:    Patria Quintana was seen today for dizziness. Diagnoses and all orders for this visit:    Dizziness  -     meclizine (ANTIVERT) 12.5 MG tablet; Take 1 tablet by mouth 3 times daily as needed for Dizziness        Return if symptoms worsen or fail to improve in several days will need ov. Natasha Messer, DO    TeleMedicine Patient Consent    This visit was performed as a virtual video visit using a synchronous, two-way, audio-video telehealth technology platform. Patient identification was verified at the start of the visit, including the patient's telephone number and physical location. I discussed with the patient the nature of our telehealth visits, that:     1. Due to the nature of an audio- video modality, the only components of a physical exam that could be done are the elements supported by direct observation. 2. I would evaluate the patient and recommend diagnostics and treatments based on my assessment. 3. If it was felt that the patient should be evaluated in clinic or an emergency room setting, then they would be directed there. 4. Our sessions are not being recorded and that personal health information is protected. 5. Our team would provide follow up care in person if/when the patient needs it. Patient does agree to proceed with telemedicine consultation. Patient's location: home address in [facility]. Physician  location [facility].  other people involved in call none. Time spent: Not billed by time    This visit was completed virtually using D[employer]. me

## 2021-05-19 RX ORDER — BUPROPION HYDROCHLORIDE 300 MG/1
TABLET ORAL
Qty: 90 TABLET | Refills: 0 | Status: SHIPPED
Start: 2021-05-19 | End: 2021-08-17

## 2021-05-19 RX ORDER — MONTELUKAST SODIUM 10 MG/1
TABLET ORAL
Qty: 90 TABLET | Refills: 0 | Status: SHIPPED
Start: 2021-05-19 | End: 2021-08-17

## 2021-08-17 RX ORDER — MONTELUKAST SODIUM 10 MG/1
TABLET ORAL
Qty: 90 TABLET | Refills: 0 | Status: SHIPPED
Start: 2021-08-17 | End: 2021-11-18

## 2021-08-17 RX ORDER — BUPROPION HYDROCHLORIDE 300 MG/1
TABLET ORAL
Qty: 90 TABLET | Refills: 0 | Status: SHIPPED
Start: 2021-08-17 | End: 2021-11-18

## 2021-08-20 DIAGNOSIS — F34.1 DYSTHYMIA: ICD-10-CM

## 2021-08-20 RX ORDER — CITALOPRAM 20 MG/1
TABLET ORAL
Qty: 30 TABLET | Refills: 4 | Status: SHIPPED
Start: 2021-08-20 | End: 2022-03-28

## 2021-11-17 DIAGNOSIS — F41.9 ACUTE ANXIETY: ICD-10-CM

## 2021-11-18 RX ORDER — BUPROPION HYDROCHLORIDE 300 MG/1
TABLET ORAL
Qty: 90 TABLET | Refills: 0 | Status: SHIPPED
Start: 2021-11-18 | End: 2022-02-27

## 2021-11-18 RX ORDER — ALPRAZOLAM 0.5 MG/1
0.5 TABLET ORAL DAILY PRN
Qty: 15 TABLET | Refills: 0 | Status: SHIPPED | OUTPATIENT
Start: 2021-11-18 | End: 2022-02-18

## 2021-11-18 RX ORDER — MONTELUKAST SODIUM 10 MG/1
TABLET ORAL
Qty: 90 TABLET | Refills: 0 | Status: SHIPPED
Start: 2021-11-18 | End: 2022-02-27

## 2021-12-03 ENCOUNTER — OFFICE VISIT (OUTPATIENT)
Dept: PRIMARY CARE CLINIC | Age: 51
End: 2021-12-03
Payer: COMMERCIAL

## 2021-12-03 VITALS
HEIGHT: 68 IN | TEMPERATURE: 97.2 F | WEIGHT: 195 LBS | HEART RATE: 69 BPM | SYSTOLIC BLOOD PRESSURE: 130 MMHG | OXYGEN SATURATION: 98 % | BODY MASS INDEX: 29.55 KG/M2 | DIASTOLIC BLOOD PRESSURE: 80 MMHG

## 2021-12-03 DIAGNOSIS — J06.9 VIRAL UPPER RESPIRATORY TRACT INFECTION: ICD-10-CM

## 2021-12-03 DIAGNOSIS — Z00.00 ANNUAL PHYSICAL EXAM: Primary | ICD-10-CM

## 2021-12-03 PROCEDURE — 99396 PREV VISIT EST AGE 40-64: CPT | Performed by: FAMILY MEDICINE

## 2021-12-03 RX ORDER — AZITHROMYCIN 250 MG/1
TABLET, FILM COATED ORAL
Qty: 1 PACKET | Refills: 0 | Status: SHIPPED
Start: 2021-12-03 | End: 2022-03-07

## 2021-12-03 RX ORDER — DEXAMETHASONE 2 MG/1
2 TABLET ORAL 2 TIMES DAILY WITH MEALS
Qty: 10 TABLET | Refills: 0 | Status: SHIPPED | OUTPATIENT
Start: 2021-12-03 | End: 2021-12-08

## 2021-12-03 SDOH — ECONOMIC STABILITY: FOOD INSECURITY: WITHIN THE PAST 12 MONTHS, THE FOOD YOU BOUGHT JUST DIDN'T LAST AND YOU DIDN'T HAVE MONEY TO GET MORE.: NEVER TRUE

## 2021-12-03 SDOH — ECONOMIC STABILITY: FOOD INSECURITY: WITHIN THE PAST 12 MONTHS, YOU WORRIED THAT YOUR FOOD WOULD RUN OUT BEFORE YOU GOT MONEY TO BUY MORE.: NEVER TRUE

## 2021-12-03 ASSESSMENT — SOCIAL DETERMINANTS OF HEALTH (SDOH): HOW HARD IS IT FOR YOU TO PAY FOR THE VERY BASICS LIKE FOOD, HOUSING, MEDICAL CARE, AND HEATING?: NOT HARD AT ALL

## 2021-12-03 NOTE — PROGRESS NOTES
SUBJECTIVE:    HPI: Emmanuelle Hancock  Was seen here today for   Chief Complaint   Patient presents with    Annual Exam     yearly    . She states they are dealing with feeling sick. Recently in [place] 1 week ago and upon returning home started having congestion, chills, possible loss of smell and taste, Cough. Denies fever. Past Medical History:   Diagnosis Date    Allergic rhinitis     Depression     Hyperlipidemia        Past Surgical History:   Procedure Laterality Date    TONSILLECTOMY         Family History   Problem Relation Age of Onset    Breast Cancer Maternal Grandmother     Other Father         MULTIPLE MYELOMA    Migraines Mother     Hypertension Brother     Diabetes Brother        Prior to Admission medications    Medication Sig Start Date End Date Taking? Authorizing Provider   azithromycin (ZITHROMAX Z-BRAYAN) 250 MG tablet Take 2 pills on day 1, then 1 pill days 2-5. 12/3/21  Yes Ahmet Harrison DO   dexamethasone (DECADRON) 2 MG tablet Take 1 tablet by mouth 2 times daily (with meals) for 5 days 12/3/21 12/8/21 Yes Ahmet Harrison DO   ALPRAZolam (XANAX) 0.5 MG tablet Take 1 tablet by mouth daily as needed for Anxiety for up to 92 days.  11/18/21 2/18/22 Yes Rosa Flower,    buPROPion (WELLBUTRIN XL) 300 MG extended release tablet take 1 tablet by mouth every morning 11/18/21  Yes Rosa Flower DO   montelukast (SINGULAIR) 10 MG tablet take 1 tablet by mouth NIGHTLY 11/18/21  Yes Janice Counter, DO   citalopram (CELEXA) 20 MG tablet take 1 tablet by mouth once daily 8/20/21  Yes Ahmet Harrison,    Cetirizine HCl (ZYRTEC PO) Take by mouth   Yes Historical Provider, MD   vitamin E 1000 units capsule Take 1,000 Units by mouth daily   Yes Historical Provider, MD   levonorgestrel (MIRENA) IUD 52 mg 1 each by Intrauterine route once   Yes Historical Provider, MD   Probiotic Product (PROBIOTIC-10) CAPS Take by mouth   Yes Historical Provider, MD BLACK COHOSH PO Take by mouth   Yes Historical Provider, MD   fluticasone (FLONASE) 50 MCG/ACT nasal spray 1 spray by Nasal route daily   Yes Historical Provider, MD       Patient has no known allergies. Social History     Tobacco Use    Smoking status: Never Smoker    Smokeless tobacco: Never Used   Substance Use Topics    Alcohol use: No         Review Of Systems:    Skin: no abnormal pigmentation, rash, scaling, itching, masses, hair or nail changes  Eyes: no blurring, diplopia, or eye pain  Ears/Nose/Throat: no hearing loss, tinnitus, vertigo, nosebleed, + nasal congestion, rhinorrhea, - sore throat  Respiratory: + cough, - pleuritic chest pain, dyspnea, or wheezing. + sob  Cardiovascular: no chest pain, angina, dyspnea on exertion, orthopnea, PND, palpitations, or claudication  Gastrointestinal: no nausea, vomiting, heartburn, diarrhea, constipation, bloating,  abdominal pain, or blood per rectum. occas food coming up on her. Genitourinary: no urinary urgency, frequency, dysuria, nocturia, hesitancy, or incontinence  Musculoskeletal: no arthritis,  + arthralgia, myalgia, weakness, or morning stiffness  Neurologic: no paralysis, paresis, paresthesia, seizures, tremors, or headaches  Hematologic/Lymphatic/Immunologic: no anemia, abnormal bleeding/bruising, fever, chills, night sweats, swollen glands, or unexplained weight loss  Endocrine: no heat or cold intolerance and no polyphagia, polydipsia, or polyuria      OBJECTIVE:    VS: /80   Pulse 69   Temp 97.2 °F (36.2 °C)   Ht 5' 8\" (1.727 m)   Wt 195 lb (88.5 kg)   SpO2 98%   BMI 29.65 kg/m²   General appearance: Alert, Awake, Oriented times x 3. Appears slightly ill. Skin: Warm and dry  Head: Normocephalic. No masses, lesions or tenderness noted  Eyes: Conjunctivae appear normal. PERRL  Ears: External ears normal, TM's clear and intact  Nose/Sinuses: Nares normal. Septum midline.  Mucosa normal. No drainage  Oropharynx: Oropharynx clear with no exudate seen  Neck: Neck supple. No jugular venous distension, lymphadenopathy or thyromegaly Trachea midline. No carotid bruits  Lungs: Lungs clear to auscultation bilaterally. No rhonchi, crackles or wheezes  Heart: S1 S2  Regular rate and rhythm. No rub, murmur or gallop  Abdomen: Abdomen soft, non-tender. BS normal. No masses, organomegaly  Extremities: No edema, Peripheral pulses palpable  Musculoskeletal: Muscular strength appears intact. No joint effusion, tenderness, swelling or warmth  Neuro:  No focal motor or sensory deficits. 2+/4 L4 reflexes        ASSESSMENT/PLAN:  Avi Rayo was seen today for annual exam.    Diagnoses and all orders for this visit:    Annual physical exam  -     Lipid Panel; Future  -     Comprehensive Metabolic Panel; Future    Viral upper respiratory tract infection  -     azithromycin (ZITHROMAX Z-BRAYAN) 250 MG tablet; Take 2 pills on day 1, then 1 pill days 2-5.  -     dexamethasone (DECADRON) 2 MG tablet; Take 1 tablet by mouth 2 times daily (with meals) for 5 days  -     COVID-19 Ambulatory; Future     - recommend colonoscopy  - recommend getting Covid test immediately  - no work until knows results of covid testing. Return if symptoms worsen or fail to improve, for based on lab results.     Tomas Harrison, DO

## 2021-12-05 LAB
SARS-COV-2: DETECTED
SOURCE: ABNORMAL

## 2021-12-07 ENCOUNTER — TELEPHONE (OUTPATIENT)
Dept: PRIMARY CARE CLINIC | Age: 51
End: 2021-12-07

## 2021-12-07 NOTE — TELEPHONE ENCOUNTER
Called patient on December 5 and left message that she was Covid positive. Recommend that she continue to stay home from work for 10 days from the beginning of when her symptoms began and resolve. If symptoms continue after 10 days do not return to work. She would need to be retested for Covid at that time.

## 2021-12-14 DIAGNOSIS — Z00.00 ANNUAL PHYSICAL EXAM: ICD-10-CM

## 2021-12-14 LAB
ALBUMIN SERPL-MCNC: 4.2 G/DL (ref 3.5–5.2)
ALP BLD-CCNC: 90 U/L (ref 35–104)
ALT SERPL-CCNC: 16 U/L (ref 0–32)
ANION GAP SERPL CALCULATED.3IONS-SCNC: 11 MMOL/L (ref 7–16)
AST SERPL-CCNC: 16 U/L (ref 0–31)
BILIRUB SERPL-MCNC: 0.4 MG/DL (ref 0–1.2)
BUN BLDV-MCNC: 12 MG/DL (ref 6–20)
CALCIUM SERPL-MCNC: 9.2 MG/DL (ref 8.6–10.2)
CHLORIDE BLD-SCNC: 105 MMOL/L (ref 98–107)
CHOLESTEROL, TOTAL: 210 MG/DL (ref 0–199)
CO2: 25 MMOL/L (ref 22–29)
CREAT SERPL-MCNC: 1 MG/DL (ref 0.5–1)
GFR AFRICAN AMERICAN: >60
GFR NON-AFRICAN AMERICAN: 59 ML/MIN/1.73
GLUCOSE BLD-MCNC: 105 MG/DL (ref 74–99)
HDLC SERPL-MCNC: 48 MG/DL
LDL CHOLESTEROL CALCULATED: 136 MG/DL (ref 0–99)
POTASSIUM SERPL-SCNC: 4.2 MMOL/L (ref 3.5–5)
SODIUM BLD-SCNC: 141 MMOL/L (ref 132–146)
TOTAL PROTEIN: 7.6 G/DL (ref 6.4–8.3)
TRIGL SERPL-MCNC: 129 MG/DL (ref 0–149)
VLDLC SERPL CALC-MCNC: 26 MG/DL

## 2021-12-16 ENCOUNTER — TELEPHONE (OUTPATIENT)
Dept: PRIMARY CARE CLINIC | Age: 51
End: 2021-12-16

## 2021-12-16 DIAGNOSIS — E78.00 PURE HYPERCHOLESTEROLEMIA: Primary | ICD-10-CM

## 2021-12-16 NOTE — TELEPHONE ENCOUNTER
Left message cholesterol still elevated recommend statin and low-cholesterol diet. She only has a 1.8% risk of ASCVD in the next 10 years. However, her cholesterol remains elevated and not at goal so do recommend the statin and low-cholesterol diet.   Call if agrees

## 2022-02-27 RX ORDER — BUPROPION HYDROCHLORIDE 300 MG/1
TABLET ORAL
Qty: 90 TABLET | Refills: 1 | Status: SHIPPED
Start: 2022-02-27 | End: 2022-08-31

## 2022-02-27 RX ORDER — MONTELUKAST SODIUM 10 MG/1
TABLET ORAL
Qty: 90 TABLET | Refills: 1 | Status: SHIPPED
Start: 2022-02-27 | End: 2022-08-31

## 2022-03-04 ENCOUNTER — NURSE TRIAGE (OUTPATIENT)
Dept: OTHER | Facility: CLINIC | Age: 52
End: 2022-03-04

## 2022-03-04 NOTE — TELEPHONE ENCOUNTER
Received call from Melina at [facility] with Red Flag Complaint. Subjective: Caller states \"abdominal pain\"     Current Symptoms: Right mid to lower pain. Mild nausea, no vomiting, diarrhea or constipation. Denies blood in urine or stools. Pain sometimes worse with eating. Nothing makes it better. Not sure what is causing the pain. Onset: 2.5 weeks ago; worsening    Associated Symptoms: reduced appetite    Pain Severity: 6/10; dull, aching; intermittent    Temperature: no fever  by unknown method    What has been tried: pepto 2 weeks ago nothing since    LMP: mirena Pregnant: No    Recommended disposition: See in Office Today    Care advice provided, patient verbalizes understanding; denies any other questions or concerns; instructed to call back for any new or worsening symptoms. Patient/Caller agrees with recommended disposition; writer provided warm transfer to Gianna Rai at [facility] for appointment scheduling     Attention Provider: Thank you for allowing me to participate in the care of your patient. The patient was connected to triage in response to information provided to the ECC/PSC. Please do not respond through this encounter as the response is not directed to a shared pool.           Reason for Disposition   MODERATE pain (e.g., interferes with normal activities that comes and goes (cramps) lasts > 24 hours (Exception: pain with Vomiting or Diarrhea - see that Protocol)    Protocols used: ABDOMINAL PAIN - [facility] - HAIM DAVIS

## 2022-03-07 ENCOUNTER — OFFICE VISIT (OUTPATIENT)
Dept: PRIMARY CARE CLINIC | Age: 52
End: 2022-03-07
Payer: COMMERCIAL

## 2022-03-07 VITALS
DIASTOLIC BLOOD PRESSURE: 70 MMHG | RESPIRATION RATE: 14 BRPM | WEIGHT: 188 LBS | SYSTOLIC BLOOD PRESSURE: 110 MMHG | OXYGEN SATURATION: 97 % | TEMPERATURE: 97.1 F | HEART RATE: 75 BPM | BODY MASS INDEX: 28.49 KG/M2 | HEIGHT: 68 IN

## 2022-03-07 DIAGNOSIS — K29.00 OTHER ACUTE GASTRITIS WITHOUT HEMORRHAGE: Primary | ICD-10-CM

## 2022-03-07 PROCEDURE — 3017F COLORECTAL CA SCREEN DOC REV: CPT | Performed by: FAMILY MEDICINE

## 2022-03-07 PROCEDURE — 99213 OFFICE O/P EST LOW 20 MIN: CPT | Performed by: FAMILY MEDICINE

## 2022-03-07 PROCEDURE — 1036F TOBACCO NON-USER: CPT | Performed by: FAMILY MEDICINE

## 2022-03-07 PROCEDURE — G8419 CALC BMI OUT NRM PARAM NOF/U: HCPCS | Performed by: FAMILY MEDICINE

## 2022-03-07 PROCEDURE — G8484 FLU IMMUNIZE NO ADMIN: HCPCS | Performed by: FAMILY MEDICINE

## 2022-03-07 PROCEDURE — G8427 DOCREV CUR MEDS BY ELIG CLIN: HCPCS | Performed by: FAMILY MEDICINE

## 2022-03-07 RX ORDER — OMEPRAZOLE 20 MG/1
20 CAPSULE, DELAYED RELEASE ORAL DAILY
Qty: 30 CAPSULE | Refills: 0 | Status: SHIPPED
Start: 2022-03-07 | End: 2022-04-01

## 2022-03-07 RX ORDER — MELOXICAM 15 MG/1
TABLET ORAL
COMMUNITY
Start: 2022-01-20 | End: 2022-08-18 | Stop reason: ALTCHOICE

## 2022-03-07 ASSESSMENT — ENCOUNTER SYMPTOMS
ABDOMINAL PAIN: 1
FLATUS: 0
HEMATOCHEZIA: 0
VOMITING: 0
NAUSEA: 1
BELCHING: 0
CONSTIPATION: 0
DIARRHEA: 1

## 2022-03-07 ASSESSMENT — PATIENT HEALTH QUESTIONNAIRE - PHQ9
5. POOR APPETITE OR OVEREATING: 1
8. MOVING OR SPEAKING SO SLOWLY THAT OTHER PEOPLE COULD HAVE NOTICED. OR THE OPPOSITE, BEING SO FIGETY OR RESTLESS THAT YOU HAVE BEEN MOVING AROUND A LOT MORE THAN USUAL: 0
SUM OF ALL RESPONSES TO PHQ QUESTIONS 1-9: 3
2. FEELING DOWN, DEPRESSED OR HOPELESS: 1
SUM OF ALL RESPONSES TO PHQ9 QUESTIONS 1 & 2: 1
4. FEELING TIRED OR HAVING LITTLE ENERGY: 0
7. TROUBLE CONCENTRATING ON THINGS, SUCH AS READING THE NEWSPAPER OR WATCHING TELEVISION: 1
SUM OF ALL RESPONSES TO PHQ QUESTIONS 1-9: 3
1. LITTLE INTEREST OR PLEASURE IN DOING THINGS: 0
3. TROUBLE FALLING OR STAYING ASLEEP: 0
10. IF YOU CHECKED OFF ANY PROBLEMS, HOW DIFFICULT HAVE THESE PROBLEMS MADE IT FOR YOU TO DO YOUR WORK, TAKE CARE OF THINGS AT HOME, OR GET ALONG WITH OTHER PEOPLE: 0
SUM OF ALL RESPONSES TO PHQ QUESTIONS 1-9: 3
9. THOUGHTS THAT YOU WOULD BE BETTER OFF DEAD, OR OF HURTING YOURSELF: 0
SUM OF ALL RESPONSES TO PHQ QUESTIONS 1-9: 3
6. FEELING BAD ABOUT YOURSELF - OR THAT YOU ARE A FAILURE OR HAVE LET YOURSELF OR YOUR FAMILY DOWN: 0

## 2022-03-07 NOTE — PROGRESS NOTES
Fanny Hernandez, a female of 49 y.o. came to the office 3/7/2022. Patient Active Problem List   Diagnosis    Chronic non-seasonal allergic rhinitis    Dysthymia    Pure hypercholesterolemia          Abdominal Pain  This is a new problem. The current episode started 1 to 4 weeks ago (3 wks). The onset quality is sudden. The problem has been unchanged. The pain is located in the generalized abdominal region. The pain is at a severity of 6/10. The quality of the pain is aching. Associated symptoms include anorexia, diarrhea (at first) and nausea. Pertinent negatives include no belching, constipation, dysuria, fever, flatus, hematochezia, melena or vomiting. Nothing aggravates the pain. The pain is relieved by nothing. She has tried nothing for the symptoms. No Known Allergies    Current Outpatient Medications on File Prior to Visit   Medication Sig Dispense Refill    diphenhydrAMINE HCl (BENADRYL ALLERGY PO)       diphenhydrAMINE-APAP, sleep, (TYLENOL PM EXTRA STRENGTH PO)       meloxicam (MOBIC) 15 MG tablet take 1 tablet by mouth once daily      Lido-Menthol-Methyl Sal-Camph (CBD KINGS EX) Apply topically      NONFORMULARY cbd capsule      buPROPion (WELLBUTRIN XL) 300 MG extended release tablet take 1 tablet by mouth every morning 90 tablet 1    montelukast (SINGULAIR) 10 MG tablet take 1 tablet by mouth NIGHTLY 90 tablet 1    citalopram (CELEXA) 20 MG tablet take 1 tablet by mouth once daily 30 tablet 4    Cetirizine HCl (ZYRTEC PO) Take by mouth      vitamin E 1000 units capsule Take 1,000 Units by mouth daily      levonorgestrel (MIRENA) IUD 52 mg 1 each by Intrauterine route once      Probiotic Product (PROBIOTIC-10) CAPS Take by mouth      fluticasone (FLONASE) 50 MCG/ACT nasal spray 1 spray by Nasal route daily      BLACK COHOSH PO Take by mouth (Patient not taking: Reported on 3/7/2022)       No current facility-administered medications on file prior to visit.        Review of Systems   Constitutional: Negative for fever. Gastrointestinal: Positive for abdominal pain, anorexia, diarrhea (at first) and nausea. Negative for constipation, flatus, hematochezia, melena and vomiting. Genitourinary: Negative for dysuria. other review of systems reviewed and are negative    OBJECTIVE:  /70   Pulse 75   Temp 97.1 °F (36.2 °C)   Resp 14   Ht 5' 8\" (1.727 m)   Wt 188 lb (85.3 kg)   SpO2 97%   BMI 28.59 kg/m²      Physical Exam  Vitals reviewed. Eyes:      General: No scleral icterus. Conjunctiva/sclera: Conjunctivae normal.   Neck:      Thyroid: No thyromegaly. Cardiovascular:      Rate and Rhythm: Normal rate and regular rhythm. Heart sounds: No murmur heard. Pulmonary:      Effort: Pulmonary effort is normal.      Breath sounds: Normal breath sounds. No wheezing or rales. Abdominal:      General: Bowel sounds are normal. There is no distension. Palpations: Abdomen is soft. There is no mass. Tenderness: There is abdominal tenderness (mild to mod) in the right upper quadrant, epigastric area and left upper quadrant. There is no right CVA tenderness, left CVA tenderness, guarding or rebound. Musculoskeletal:         General: Normal range of motion. Cervical back: Neck supple. Lymphadenopathy:      Cervical: No cervical adenopathy. Skin:     General: Skin is warm and dry. Neurological:      Mental Status: She is alert and oriented to person, place, and time. Psychiatric:         Mood and Affect: Mood normal.         ASSESSMENT AND PLAN:    Elvis Shin was seen today for abdominal pain. Diagnoses and all orders for this visit:    Other acute gastritis without hemorrhage  -     omeprazole (PRILOSEC) 20 MG delayed release capsule; Take 1 capsule by mouth daily        Return if symptoms worsen or fail to improve.     Martha Harrison,

## 2022-03-14 ENCOUNTER — OFFICE VISIT (OUTPATIENT)
Dept: PRIMARY CARE CLINIC | Age: 52
End: 2022-03-14
Payer: COMMERCIAL

## 2022-03-14 VITALS
WEIGHT: 187 LBS | TEMPERATURE: 97.3 F | OXYGEN SATURATION: 96 % | BODY MASS INDEX: 28.43 KG/M2 | DIASTOLIC BLOOD PRESSURE: 82 MMHG | HEART RATE: 77 BPM | SYSTOLIC BLOOD PRESSURE: 130 MMHG

## 2022-03-14 DIAGNOSIS — K29.00 OTHER ACUTE GASTRITIS WITHOUT HEMORRHAGE: Primary | ICD-10-CM

## 2022-03-14 DIAGNOSIS — R10.11 RUQ PAIN: ICD-10-CM

## 2022-03-14 PROCEDURE — G8419 CALC BMI OUT NRM PARAM NOF/U: HCPCS | Performed by: FAMILY MEDICINE

## 2022-03-14 PROCEDURE — G8484 FLU IMMUNIZE NO ADMIN: HCPCS | Performed by: FAMILY MEDICINE

## 2022-03-14 PROCEDURE — 1036F TOBACCO NON-USER: CPT | Performed by: FAMILY MEDICINE

## 2022-03-14 PROCEDURE — 3017F COLORECTAL CA SCREEN DOC REV: CPT | Performed by: FAMILY MEDICINE

## 2022-03-14 PROCEDURE — 99213 OFFICE O/P EST LOW 20 MIN: CPT | Performed by: FAMILY MEDICINE

## 2022-03-14 PROCEDURE — G8427 DOCREV CUR MEDS BY ELIG CLIN: HCPCS | Performed by: FAMILY MEDICINE

## 2022-03-14 RX ORDER — SUCRALFATE 1 G/1
1 TABLET ORAL
Qty: 90 TABLET | Refills: 0 | Status: SHIPPED
Start: 2022-03-14 | End: 2022-08-18

## 2022-03-14 ASSESSMENT — ENCOUNTER SYMPTOMS
HEMATEMESIS: 0
DIARRHEA: 0
NAUSEA: 1
JAUNDICE: 0
ABDOMINAL PAIN: 1
HEMATOCHEZIA: 0
RESPIRATORY NEGATIVE: 1
CONSTIPATION: 0
VOMITING: 0
EYES NEGATIVE: 1

## 2022-03-14 NOTE — PROGRESS NOTES
Lani Bone, a female of 49 y.o. came to the office 3/14/2022. Patient Active Problem List   Diagnosis    Chronic non-seasonal allergic rhinitis    Dysthymia    Pure hypercholesterolemia          GI Problem  The primary symptoms include abdominal pain and nausea. Primary symptoms do not include fever, vomiting, diarrhea, hematemesis, jaundice or hematochezia. The illness began more than 7 days ago (6 weeks ago ). The problem has been gradually worsening. The illness does not include constipation. started on prilosec last week, with some relief but still complaining of epigastric/BL UQ pain 6/10. No change in appetite or eating habits. Postprandial Pain roughly 30 minutes after AM meal. She is afraid the pain will recur if she eats anything else throughout the day. No hx of abdominal surgery, does still have gallbladder.     No Known Allergies    Current Outpatient Medications on File Prior to Visit   Medication Sig Dispense Refill    diphenhydrAMINE HCl (BENADRYL ALLERGY PO)       diphenhydrAMINE-APAP, sleep, (TYLENOL PM EXTRA STRENGTH PO)       meloxicam (MOBIC) 15 MG tablet take 1 tablet by mouth once daily      Lido-Menthol-Methyl Sal-Camph (CBD KINGS EX) Apply topically      NONFORMULARY cbd capsule      omeprazole (PRILOSEC) 20 MG delayed release capsule Take 1 capsule by mouth daily 30 capsule 0    buPROPion (WELLBUTRIN XL) 300 MG extended release tablet take 1 tablet by mouth every morning 90 tablet 1    montelukast (SINGULAIR) 10 MG tablet take 1 tablet by mouth NIGHTLY 90 tablet 1    citalopram (CELEXA) 20 MG tablet take 1 tablet by mouth once daily 30 tablet 4    Cetirizine HCl (ZYRTEC PO) Take by mouth      vitamin E 1000 units capsule Take 1,000 Units by mouth daily      levonorgestrel (MIRENA) IUD 52 mg 1 each by Intrauterine route once      Probiotic Product (PROBIOTIC-10) CAPS Take by mouth      BLACK COHOSH PO Take by mouth       fluticasone (FLONASE) 50 MCG/ACT nasal spray 1 spray by Nasal route daily       No current facility-administered medications on file prior to visit. Review of Systems   Constitutional: Negative for fever. HENT: Negative. Eyes: Negative. Respiratory: Negative. Cardiovascular: Negative. Gastrointestinal: Positive for abdominal pain and nausea. Negative for constipation, diarrhea, hematemesis, hematochezia, jaundice and vomiting. Genitourinary: Negative. Musculoskeletal: Negative. other review of systems reviewed and are negative    OBJECTIVE:  /82   Pulse 77   Temp 97.3 °F (36.3 °C)   Wt 187 lb (84.8 kg)   SpO2 96%   BMI 28.43 kg/m²      Physical Exam  Constitutional:       Appearance: Normal appearance. HENT:      Head: Normocephalic and atraumatic. Mouth/Throat:      Mouth: Mucous membranes are moist.      Pharynx: Oropharynx is clear. No oropharyngeal exudate or posterior oropharyngeal erythema. Eyes:      General: No scleral icterus. Extraocular Movements: Extraocular movements intact. Pupils: Pupils are equal, round, and reactive to light. Cardiovascular:      Rate and Rhythm: Normal rate and regular rhythm. Pulses: Normal pulses. Heart sounds: Normal heart sounds. Pulmonary:      Effort: Pulmonary effort is normal.      Breath sounds: Normal breath sounds. Abdominal:      General: Bowel sounds are normal. There is no distension. Palpations: Abdomen is soft. Tenderness: There is abdominal tenderness in the right upper quadrant, epigastric area and left upper quadrant. There is no guarding or rebound. Negative signs include Acosta's sign. Hernia: No hernia is present. Comments: Hypochondrium/epigastric abdominal tenderness not reproducible on palpation. Skin:     General: Skin is warm and dry. Neurological:      General: No focal deficit present. Mental Status: She is alert and oriented to person, place, and time.          ASSESSMENT AND PLAN:    Laurent Renteria was seen today for gi problem and follow-up. Diagnoses and all orders for this visit:    Other acute gastritis without hemorrhage  -     sucralfate (CARAFATE) 1 GM tablet; Take 1 tablet by mouth 3 times daily (with meals)    RUQ pain  -     US GALLBLADDER RUQ; Future    - continue Prilosec daily  - add Carafate. Return if symptoms worsen or fail to improve, for based on results. Prema Workman DO    I reviewed the students documentation ( Hx, exam, MDM ), examined the patient and performed the A&P.

## 2022-03-28 DIAGNOSIS — F34.1 DYSTHYMIA: ICD-10-CM

## 2022-03-28 RX ORDER — CITALOPRAM 20 MG/1
TABLET ORAL
Qty: 30 TABLET | Refills: 4 | Status: SHIPPED
Start: 2022-03-28 | End: 2022-08-18 | Stop reason: ALTCHOICE

## 2022-03-29 ENCOUNTER — HOSPITAL ENCOUNTER (OUTPATIENT)
Dept: ULTRASOUND IMAGING | Age: 52
Discharge: HOME | End: 2022-03-31
Payer: COMMERCIAL

## 2022-03-29 DIAGNOSIS — R10.11 RUQ PAIN: ICD-10-CM

## 2022-03-29 PROCEDURE — 76705 ECHO EXAM OF ABDOMEN: CPT

## 2022-03-31 ENCOUNTER — TELEPHONE (OUTPATIENT)
Dept: PRIMARY CARE CLINIC | Age: 52
End: 2022-03-31

## 2022-03-31 NOTE — TELEPHONE ENCOUNTER
Left message gallbladder ultrasound normal.  If she is not feeling better with the Carafate and a PPI please let me know.

## 2022-04-01 DIAGNOSIS — K29.00 OTHER ACUTE GASTRITIS WITHOUT HEMORRHAGE: ICD-10-CM

## 2022-04-01 RX ORDER — OMEPRAZOLE 20 MG/1
CAPSULE, DELAYED RELEASE ORAL
Qty: 30 CAPSULE | Refills: 2 | Status: SHIPPED
Start: 2022-04-01 | End: 2022-08-18 | Stop reason: ALTCHOICE

## 2022-04-09 DIAGNOSIS — K29.00 OTHER ACUTE GASTRITIS WITHOUT HEMORRHAGE: ICD-10-CM

## 2022-04-11 RX ORDER — SUCRALFATE 1 G/1
TABLET ORAL
Qty: 90 TABLET | Refills: 0 | OUTPATIENT
Start: 2022-04-11

## 2022-06-07 ENCOUNTER — OFFICE VISIT (OUTPATIENT)
Dept: FAMILY MEDICINE CLINIC | Age: 52
End: 2022-06-07
Payer: COMMERCIAL

## 2022-06-07 VITALS
HEART RATE: 110 BPM | BODY MASS INDEX: 29.1 KG/M2 | TEMPERATURE: 97.1 F | WEIGHT: 191.4 LBS | DIASTOLIC BLOOD PRESSURE: 78 MMHG | OXYGEN SATURATION: 99 % | SYSTOLIC BLOOD PRESSURE: 116 MMHG

## 2022-06-07 DIAGNOSIS — U07.1 COVID-19: Primary | ICD-10-CM

## 2022-06-07 DIAGNOSIS — J06.9 URI WITH COUGH AND CONGESTION: ICD-10-CM

## 2022-06-07 LAB
Lab: ABNORMAL
PERFORMING INSTRUMENT: ABNORMAL
QC PASS/FAIL: ABNORMAL
SARS-COV-2, POC: DETECTED

## 2022-06-07 PROCEDURE — G8419 CALC BMI OUT NRM PARAM NOF/U: HCPCS | Performed by: NURSE PRACTITIONER

## 2022-06-07 PROCEDURE — 1036F TOBACCO NON-USER: CPT | Performed by: NURSE PRACTITIONER

## 2022-06-07 PROCEDURE — 3017F COLORECTAL CA SCREEN DOC REV: CPT | Performed by: NURSE PRACTITIONER

## 2022-06-07 PROCEDURE — G8427 DOCREV CUR MEDS BY ELIG CLIN: HCPCS | Performed by: NURSE PRACTITIONER

## 2022-06-07 PROCEDURE — 87426 SARSCOV CORONAVIRUS AG IA: CPT | Performed by: NURSE PRACTITIONER

## 2022-06-07 PROCEDURE — 99213 OFFICE O/P EST LOW 20 MIN: CPT | Performed by: NURSE PRACTITIONER

## 2022-06-07 RX ORDER — DOXYCYCLINE HYCLATE 100 MG
100 TABLET ORAL 2 TIMES DAILY
Qty: 14 TABLET | Refills: 0 | Status: SHIPPED | OUTPATIENT
Start: 2022-06-07 | End: 2022-06-14

## 2022-06-07 NOTE — PROGRESS NOTES
Chief Complaint   Congestion and Cough    History of Present Illness   Source of history provided by:  patient. Dat Castillo is a 49 y.o. old female who presents to walk-in with complaints of Fever, Chills, Generalized Body Aches, Headache, Pharyngitis, Rhinorrhea, Nasal Congestion, Post nasal drip, productive Cough, Chest congestion and Patient was exposed to someone who is a known Covid-19 infected person or directly caring for such person x 7 days. States symptoms have improved then came back since onset. Has been taking Benadryl without symptomatic relief. Currently denies any Shortness of breath, Nausea, Vomiting, Chest Pain, Abdominal Pain, Rash or Lethargy. Denies any hx of asthma. Denies tobacco use. Patient reports history of COVID-19. Patient has received a COVID-19 vaccination. ROS   Pertinent positives and negatives are stated within HPI, all other systems reviewed and are negative. Past Medical History:  has a past medical history of Allergic rhinitis, COVID-19 virus infection, Depression, and Hyperlipidemia. Past Surgical History:  has a past surgical history that includes Tonsillectomy. Social History:  reports that she has never smoked. She has never used smokeless tobacco. She reports that she does not drink alcohol and does not use drugs. Family History: family history includes Breast Cancer in her maternal grandmother; Diabetes in her brother; Hypertension in her brother; Migraines in her mother; Other in her father. Allergies: Patient has no known allergies. Physical Exam   Vital Signs:  /78 (Site: Right Upper Arm, Position: Sitting)   Pulse (!) 110   Temp 97.1 °F (36.2 °C) (Temporal)   Wt 191 lb 6.4 oz (86.8 kg)   SpO2 99%   BMI 29.10 kg/m²    Oxygen Saturation Interpretation: Normal.    Constitutional:  Alert, development consistent with age. NAD. Head:  NC/NT. Airway patent. Ears: TMs clear bilaterally.  Canals without exudate or swelling bilaterally. Mouth: Posterior pharynx with mild erythema and clear postnasal drip. There is no tonsillar hypertrophy or exudate. Neck:  Normal ROM. Supple. There is no anterior cervical adenopathy noted. Lungs: CTAB without wheezes, rales, or rhonchi. CV:  Regular rate and rhythm, normal heart sounds, without pathological murmurs, ectopy, gallops, or rubs. Skin:  Normal turgor. Warm, dry, without visible rash. Lymphatic: No lymphangitis or adenopathy noted. Neurological:  Oriented. Motor functions intact. Lab / Imaging Results   (All laboratory and radiology results have been personally reviewed by myself)  Labs:  Results for orders placed or performed in visit on 06/07/22   POCT COVID-19, Antigen   Result Value Ref Range    SARS-COV-2, POC Detected (A) Not Detected    Lot Number [id number]     QC Pass/Fail pass     Performing Instrument BD Veritor      Imaging: All Radiology results interpreted by Radiologist unless otherwise noted. No results found. Medical Decision Making   Pt non-toxic, in no apparent distress and stable at time of discharge. Assessment/Plan   Oliverioalida Olguin was seen today for congestion and cough. Diagnoses and all orders for this visit:    COVID-19  -     POCT COVID-19, Antigen    URI with cough and congestion  -     doxycycline hyclate (VIBRA-TABS) 100 MG tablet; Take 1 tablet by mouth 2 times daily for 7 days    Rapid COVID-19 positive in office, pt advised of results. Pt advised of current CDC guidelines regarding isolation. Also advised current criteria to d/c isolation. Increase fluids and rest. Symptomatic relief discussed including Tylenol prn pain/fever. Schedule f/u with PCP in 7-10 days if symptoms persist. ED sooner if symptoms worsen or change. ED immediately with high or refractory fever, progressive SOB, dyspnea, CP, calf pain/swelling, shaking chills, vomiting, abdominal pain, lethargy, flank pain, or decreased urinary output.  Pt verbalizes understanding and is

## 2022-08-18 LAB — MAMMOGRAPHY, EXTERNAL: NORMAL

## 2022-08-31 RX ORDER — BUPROPION HYDROCHLORIDE 300 MG/1
TABLET ORAL
Qty: 90 TABLET | Refills: 1 | Status: SHIPPED | OUTPATIENT
Start: 2022-08-31

## 2022-08-31 RX ORDER — MONTELUKAST SODIUM 10 MG/1
TABLET ORAL
Qty: 90 TABLET | Refills: 1 | Status: SHIPPED | OUTPATIENT
Start: 2022-08-31

## 2022-11-09 DIAGNOSIS — F41.9 ACUTE ANXIETY: ICD-10-CM

## 2022-11-10 RX ORDER — ALPRAZOLAM 0.5 MG/1
TABLET ORAL
Qty: 15 TABLET | Refills: 0 | Status: SHIPPED | OUTPATIENT
Start: 2022-11-10 | End: 2023-05-09

## 2022-11-22 ENCOUNTER — OFFICE VISIT (OUTPATIENT)
Dept: PRIMARY CARE CLINIC | Age: 52
End: 2022-11-22
Payer: COMMERCIAL

## 2022-11-22 VITALS
TEMPERATURE: 97.5 F | DIASTOLIC BLOOD PRESSURE: 72 MMHG | WEIGHT: 187 LBS | SYSTOLIC BLOOD PRESSURE: 120 MMHG | BODY MASS INDEX: 28.43 KG/M2 | OXYGEN SATURATION: 99 % | HEART RATE: 69 BPM

## 2022-11-22 DIAGNOSIS — Z12.11 COLON CANCER SCREENING: ICD-10-CM

## 2022-11-22 DIAGNOSIS — Z00.00 ANNUAL PHYSICAL EXAM: Primary | ICD-10-CM

## 2022-11-22 DIAGNOSIS — Z00.00 ANNUAL PHYSICAL EXAM: ICD-10-CM

## 2022-11-22 LAB
ALBUMIN SERPL-MCNC: 4.7 G/DL (ref 3.5–5.2)
ALP BLD-CCNC: 87 U/L (ref 35–104)
ALT SERPL-CCNC: 16 U/L (ref 0–32)
ANION GAP SERPL CALCULATED.3IONS-SCNC: 14 MMOL/L (ref 7–16)
AST SERPL-CCNC: 22 U/L (ref 0–31)
BILIRUB SERPL-MCNC: 0.4 MG/DL (ref 0–1.2)
BUN BLDV-MCNC: 13 MG/DL (ref 6–20)
CALCIUM SERPL-MCNC: 9.7 MG/DL (ref 8.6–10.2)
CHLORIDE BLD-SCNC: 102 MMOL/L (ref 98–107)
CHOLESTEROL, TOTAL: 225 MG/DL (ref 0–199)
CO2: 26 MMOL/L (ref 22–29)
CREAT SERPL-MCNC: 1 MG/DL (ref 0.5–1)
GFR SERPL CREATININE-BSD FRML MDRD: >60 ML/MIN/1.73
GLUCOSE BLD-MCNC: 107 MG/DL (ref 74–99)
HCT VFR BLD CALC: 43.8 % (ref 34–48)
HDLC SERPL-MCNC: 50 MG/DL
HEMOGLOBIN: 14 G/DL (ref 11.5–15.5)
LDL CHOLESTEROL CALCULATED: 154 MG/DL (ref 0–99)
MCH RBC QN AUTO: 27.4 PG (ref 26–35)
MCHC RBC AUTO-ENTMCNC: 32 % (ref 32–34.5)
MCV RBC AUTO: 85.7 FL (ref 80–99.9)
PDW BLD-RTO: 13.7 FL (ref 11.5–15)
PLATELET # BLD: 278 E9/L (ref 130–450)
PMV BLD AUTO: 10.7 FL (ref 7–12)
POTASSIUM SERPL-SCNC: 4.2 MMOL/L (ref 3.5–5)
RBC # BLD: 5.11 E12/L (ref 3.5–5.5)
SODIUM BLD-SCNC: 142 MMOL/L (ref 132–146)
TOTAL PROTEIN: 7.7 G/DL (ref 6.4–8.3)
TRIGL SERPL-MCNC: 107 MG/DL (ref 0–149)
VLDLC SERPL CALC-MCNC: 21 MG/DL
WBC # BLD: 7 E9/L (ref 4.5–11.5)

## 2022-11-22 PROCEDURE — G8484 FLU IMMUNIZE NO ADMIN: HCPCS | Performed by: FAMILY MEDICINE

## 2022-11-22 PROCEDURE — 99396 PREV VISIT EST AGE 40-64: CPT | Performed by: FAMILY MEDICINE

## 2022-11-22 NOTE — PROGRESS NOTES
SUBJECTIVE:    HPI: Spring Kemp  Was seen here today for   Chief Complaint   Patient presents with    Annual Exam    .  She states they are dealing with no new issues. Past Medical History:   Diagnosis Date    Allergic rhinitis     COVID-19 virus infection 11/25/2021    Depression     Hyperlipidemia        Past Surgical History:   Procedure Laterality Date    TONSILLECTOMY         Family History   Problem Relation Age of Onset    Breast Cancer Maternal Grandmother     Other Father         MULTIPLE MYELOMA    Migraines Mother     Hypertension Brother     Diabetes Brother        Prior to Admission medications    Medication Sig Start Date End Date Taking? Authorizing Provider   ALPRAZolam Lenmayra Burroughs) 0.5 MG tablet Take 1 tablet by mouth daily as needed for AnxietY 11/10/22 5/9/23  [place] Dennis Harrison, DO   buPROPion (WELLBUTRIN XL) 300 MG extended release tablet take 1 tablet by mouth every morning 8/31/22   [place] Dennis Harrison, DO   montelukast (SINGULAIR) 10 MG tablet take 1 tablet by mouth nightly 8/31/22   [place] Dennis Harrison, DO   diphenhydrAMINE HCl (BENADRYL ALLERGY PO)  6/1/21   Historical Provider, MD   diphenhydrAMINE-APAP, sleep, (TYLENOL PM EXTRA STRENGTH PO)  7/1/21   Historical Provider, MD   Cetirizine HCl (ZYRTEC PO) Take by mouth    Historical Provider, MD   vitamin E 1000 units capsule Take 1,000 Units by mouth daily    Historical Provider, MD   levonorgestrel (MIRENA) IUD 52 mg 1 each by Intrauterine route once    Historical Provider, MD   Probiotic Product (PROBIOTIC-10) CAPS Take by mouth    Historical Provider, MD   BLACK COHOSH PO Take by mouth     Historical Provider, MD   fluticasone (FLONASE) 50 MCG/ACT nasal spray 1 spray by Nasal route daily    Historical Provider, MD       Patient has no known allergies.     Social History     Tobacco Use    Smoking status: Never    Smokeless tobacco: Never   Substance Use Topics    Alcohol use: No         Review Of Systems:    Skin: no abnormal pigmentation, rash, scaling, itching, masses, hair or nail changes  Eyes: no blurring, diplopia, or eye pain  Ears/Nose/Throat: no hearing loss, tinnitus, vertigo, nosebleed, nasal congestion, rhinorrhea, sore throat  Respiratory: no cough, pleuritic chest pain, dyspnea, or wheezing  Cardiovascular: no chest pain, angina, dyspnea on exertion, orthopnea, PND, palpitations, or claudication  Gastrointestinal: no nausea, vomiting, heartburn, diarrhea, constipation, bloating,  abdominal pain, or blood per rectum  Genitourinary: no urinary urgency, frequency, dysuria, nocturia, hesitancy, or incontinence  Musculoskeletal: no arthritis, arthralgia, myalgia, weakness, or morning stiffness  Neurologic: no paralysis, paresis, paresthesia, seizures, tremors, or headaches  Hematologic/Lymphatic/Immunologic: no anemia, abnormal bleeding/bruising, fever, chills, night sweats, swollen glands, or unexplained weight loss  Endocrine: no heat or cold intolerance and no polyphagia, polydipsia, or polyuria  Psych: moods doing ok. Uses xanax sparingly ie stress with daughter in basketball. OBJECTIVE:    VS: /72   Pulse 69   Temp 97.5 °F (36.4 °C)   Wt 187 lb (84.8 kg)   SpO2 99%   BMI 28.43 kg/m²   General appearance: Alert, Awake, Oriented times 3, no distress  Skin: Warm and dry  Head: Normocephalic. No masses, lesions or tenderness noted  Eyes: Conjunctivae appear normal. PERRL  Ears: External ears normal, TM's clear and intact  Nose/Sinuses: Nares normal. Septum midline. Mucosa normal. No drainage  Oropharynx: Oropharynx clear with no exudate seen  Neck: Neck supple. No jugular venous distension, lymphadenopathy or thyromegaly Trachea midline. No carotid bruits  Lungs: Lungs clear to auscultation bilaterally. No rhonchi, crackles or wheezes  Heart: S1 S2  Regular rate and rhythm. No rub, murmur or gallop  Abdomen: Abdomen soft, non-tender.  BS normal. No masses, organomegaly  Extremities: No edema, Peripheral pulses palpable  Musculoskeletal: Muscular strength appears intact. No joint effusion, tenderness, swelling or warmth  Neuro:  No focal motor or sensory deficits. 2+/4 L4 reflexes        ASSESSMENT/PLAN:  Mara Roberts was seen today for annual exam.    Diagnoses and all orders for this visit:    Annual physical exam  -     Lipid Panel; Future  -     CBC; Future  -     Comprehensive Metabolic Panel; Future    Colon cancer screening  -     Ayush Mobley MD, General Surgery, [facility]     - recommend colon screening. Return in about 1 year (around 11/22/2023) for or for acute problem.     Doreen Harrison, DO

## 2022-11-29 ENCOUNTER — TELEPHONE (OUTPATIENT)
Dept: PRIMARY CARE CLINIC | Age: 52
End: 2022-11-29

## 2022-11-29 DIAGNOSIS — E78.00 PURE HYPERCHOLESTEROLEMIA: Primary | ICD-10-CM

## 2022-11-29 RX ORDER — ROSUVASTATIN CALCIUM 5 MG/1
5 TABLET, COATED ORAL DAILY
Qty: 30 TABLET | Refills: 3 | Status: SHIPPED | OUTPATIENT
Start: 2022-11-29

## 2022-11-29 NOTE — TELEPHONE ENCOUNTER
Called patient left message cholesterol elevated. She only has a 1.1% ASCVD risk in the next 10 years however I still recommend a cholesterol-lowering agent as well low cholesterol diet due to her LDL being greater than 150. Call for if agrees.

## 2022-12-19 ENCOUNTER — OFFICE VISIT (OUTPATIENT)
Dept: SURGERY | Age: 52
End: 2022-12-19

## 2022-12-19 VITALS
OXYGEN SATURATION: 94 % | SYSTOLIC BLOOD PRESSURE: 139 MMHG | HEIGHT: 68 IN | RESPIRATION RATE: 18 BRPM | DIASTOLIC BLOOD PRESSURE: 77 MMHG | BODY MASS INDEX: 28.49 KG/M2 | TEMPERATURE: 97.1 F | HEART RATE: 79 BPM | WEIGHT: 188 LBS

## 2022-12-19 DIAGNOSIS — Z12.11 ENCOUNTER FOR SCREENING COLONOSCOPY: Primary | ICD-10-CM

## 2022-12-19 PROCEDURE — 99024 POSTOP FOLLOW-UP VISIT: CPT | Performed by: SURGERY

## 2022-12-19 RX ORDER — SODIUM CHLORIDE 9 MG/ML
INJECTION, SOLUTION INTRAVENOUS CONTINUOUS
OUTPATIENT
Start: 2022-12-19

## 2022-12-19 NOTE — PATIENT INSTRUCTIONS
Mariah Jj MD, FACS    Preoperative Instructions    Please read the following information very carefully. It contains information that is necessary to best prepare you for your upcoming procedure. Make arrangements for a  to take you to and from your procedure. YOU MUST HAVE SOMEONE DRIVE YOU HOME - this cannot be a taxi or public transportation. You will not be administered anesthesia without someone to go home and be at home with you that day. Nothing to eat or drink after midnight the night before your procedure. Follow your bowel prep instructions if you have them for this procedure. 3 days prior to your procedure: Stop taking blood thinners like Coumadin or Plavix or Xarelto. 5 days prior to your procedure: Stop taking Aspirin or Aspirin containing products. If you cannot stop any of these medications prior to your procedure, please contact our office. Medications morning of procedure: Only heart, breathing, blood pressure, and seizure medications are permitted on the morning of your procedure. These medications can be taken with a sip of water. IF YOU ARE UNABLE TO KEEP THE ABOVE SCHEDULED PROCEDURE, YOU MUST NOTIFY DR. LARSON'S OFFICE [phone]. NOT THE FACILITY. NO CHEWING GUM OR CHEWING TOBACCO AFTER MIDNIGHT ON DAY OF PROCEDURE.    YOU MUST HAVE TRANSPORTATION TO AND FROM THE FACILITY. What is a colonoscopy? A colonoscopy is a test that lets a doctor look inside your colon. The doctor uses a thin, lighted tube called a colonoscope to look for problems. These include small growths called polyps, cancer, or bleeding. During the test, the doctor can take samples of tissue that can be checked for cancer or other problems. This is called a biopsy. The doctor can also take out polyps. Before the test, you will need to stop eating solid foods. You also will drink a liquid or take a tablet that cleans out your colon.  This helps your doctor be able to see inside your colon during the test.  Follow-up care is a key part of your treatment and safety. Be sure to make and go to all appointments, and call your doctor if you are having problems. It's also a good idea to know your test results and keep a list of the medicines you take. What happens before the procedure? Procedures can be stressful. This information will help you understand what you can expect. And it will help you safely prepare for your procedure. Preparing for the procedure  Understand exactly what procedure is planned, along with the risks, benefits, and other options. Tell your doctors ALL the medicines, vitamins, supplements, and herbal remedies you take. Some of these can increase the risk of bleeding or interact with anesthesia. If you take blood thinners, such as warfarin (Coumadin), clopidogrel (Plavix), or aspirin, be sure to talk to your doctor. He or she will tell you if you should stop taking these medicines before your procedure. Make sure that you understand exactly what your doctor wants you to do. Your doctor will tell you which medicines to take or stop before your procedure. You may need to stop taking certain medicines a week or more before the procedure. So talk to your doctor as soon as you can. If you have an advance directive, let your doctor know. It may include a living will and a durable power of  for health care. Bring a copy to the hospital. If you don't have one, you may want to prepare one. It lets your doctor and loved ones know your health care wishes. Doctors advise that everyone prepare these papers before any type of surgery or procedure. Before the procedure  Follow your doctor's directions about when to stop eating solid foods and drink only clear liquids. You can drink water, clear juices, clear broths, flavored ice pops, and gelatin (such as Jell-O). Do not eat or drink anything red or purple.  This includes grape juice and grape-flavored ice pops. It also includes fruit punch and cherry gelatin. Drink the \"colon prep\" liquid as your doctor tells you. You will want to stay home, because the liquid will make you go to the bathroom a lot. Your stools will be loose and watery. It is very important to drink all of the liquid. If you have problems drinking it, call your doctor. Some doctors may have you take a tablet rather than drink a liquid. Do not eat any solid foods after you drink the colon prep. Stop drinking clear liquids 6 to 8 hours before the test.  What happens on the day of the procedure? Follow the instructions exactly about when to stop eating and drinking. If you don't, your procedure may be canceled. If your doctor told you to take your medicines on the day of the procedure, take them with only a sip of water. Take a bath or shower before you come in for your procedure. Do not apply lotions, perfumes, deodorants, or nail polish. Take off all jewelry and piercings. And take out contact lenses, if you wear them. At the [address] [place] [address] or hospital  Bring a picture ID. You will be kept comfortable and safe by your anesthesia provider. The anesthesia may make you sleep. You will lie on your back or your side with your knees drawn up toward your belly. The doctor will gently put a gloved finger into your anus. Then the doctor puts the scope in and moves it into your colon. The scope goes in easily because it is lubricated. The doctor may also use small tools to take tissue samples for a biopsy or to remove polyps. This does not hurt. The test usually takes 30 to 45 minutes. But it may take longer. It depends on what is found and what is done. Going home  Be sure you have someone to drive you home. Anesthesia and pain medicine make it unsafe for you to drive. You will be given more specific instructions about recovering from your procedure. When should you call your doctor? You have questions or concerns.   You don't understand how to prepare for your procedure. You are having trouble with the bowel prep. You become ill before the procedure (such as fever, flu, or a cold). You need to reschedule or have changed your mind about having the procedure. Where can you learn more? Go to [email or web address] and sign in to your [employer] account. Enter C315 in the [employer]Nova box to learn more about Colonoscopy: Before Your Procedure.     If you do not have an account, please click on the Sign Up Now link. © 1810-3327 Healthwise, Incorporated. Care instructions adapted under license by [facility] ([facility]). This care instruction is for use with your licensed healthcare professional. If you have questions about a medical condition or this instruction, always ask your healthcare professional. [address] any warranty or liability for your use of this information.   Content Version: 62.5.502486; Current as of: November 20, 2015

## 2022-12-19 NOTE — PROGRESS NOTES
General Surgery History and Physical    Patient's Name/Date of Birth: Lopez Jordan / 1970    Date: 12/19/2022    PCP: Quang Youssef DO    Referring Physician:   Barry Luna*  [phone]    CHIEF COMPLAINT:    Chief Complaint   Patient presents with    Colonoscopy         HISTORY OF PRESENT ILLNESS:    Lopez Jordan is an 47 y.o. female who presents for a colonoscopy. The patient denies any symptoms. No nausea, vomiting, diarrhea, constipation. No changes in stool caliber. No bloody or black stools. No abdominal pain. No unintentional weight loss. No family history of colon cancer. The patient has a known history of: no known risk factors. The patient has had a colonoscopy before - 10 years ago and was normal per the patient. Past Medical History:   Past Medical History:   Diagnosis Date    Allergic rhinitis     COVID-19 virus infection 11/25/2021    Depression     Hyperlipidemia         Past Surgical History:   Past Surgical History:   Procedure Laterality Date    TONSILLECTOMY          Allergies: Patient has no known allergies.      Medications:   Current Outpatient Medications   Medication Sig Dispense Refill    rosuvastatin (CRESTOR) 5 MG tablet Take 1 tablet by mouth daily 30 tablet 3    ALPRAZolam (XANAX) 0.5 MG tablet Take 1 tablet by mouth daily as needed for AnxietY 15 tablet 0    buPROPion (WELLBUTRIN XL) 300 MG extended release tablet take 1 tablet by mouth every morning 90 tablet 1    montelukast (SINGULAIR) 10 MG tablet take 1 tablet by mouth nightly 90 tablet 1    diphenhydrAMINE HCl (BENADRYL ALLERGY PO)       diphenhydrAMINE-APAP, sleep, (TYLENOL PM EXTRA STRENGTH PO)       Cetirizine HCl (ZYRTEC PO) Take by mouth      vitamin E 1000 units capsule Take 1,000 Units by mouth daily      levonorgestrel (MIRENA) IUD 52 mg 1 each by Intrauterine route once      Probiotic Product (PROBIOTIC-10) CAPS Take by mouth      BLACK COHOSH PO Take by mouth       fluticasone (FLONASE) 50 MCG/ACT nasal spray 1 spray by Nasal route daily       No current facility-administered medications for this visit. Social History:   Social History     Tobacco Use    Smoking status: Never    Smokeless tobacco: Never   Substance Use Topics    Alcohol use: No        Family History:   Family History   Problem Relation Age of Onset    Breast Cancer Maternal Grandmother     Other Father         MULTIPLE MYELOMA    Migraines Mother     Hypertension Brother     Diabetes Brother        REVIEW OF SYSTEMS:    Constitutional: negative  Eyes: negative  Ears, nose, mouth, throat, and face: negative  Respiratory: negative  Cardiovascular: negative  Gastrointestinal: as in HPI  Genitourinary:negative  Integument/breast: negative  Hematologic/lymphatic: negative  Musculoskeletal:negative  Neurological: negative  Allergic/Immunologic: negative    PHYSICAL EXAM   /77   Pulse 79   Temp 97.1 °F (36.2 °C) (Infrared)   Resp 18   Ht 5' 8\" (1.727 m)   Wt 188 lb (85.3 kg)   SpO2 94%   BMI 28.59 kg/m²     General appearance: alert, cooperative and in no acute distress. Eyes: Grossly normal   Lungs: normal work of breathing  Heart: regular rate  Abdomen:  soft, non-tender, non-distended  Skin: No skin abnormalities  Neurologic: Alert and oriented x 3. Grossly normal  Musculoskeletal: No edema. ASSESSMENT AND PLAN:     Praveen Rankin is an 47 y.o. female who presents for a colonoscopy for screening     I will set the patient up for a colonoscopy, possible biopsy, possible polypectomy. I explained the risks including but not limited to bleeding, perforation leading to possible surgery, or infection. The benefits, alternatives, and potential complications associated with the above procedure to be performed and transfusions when applicable with the patient/responsible person prior to the procedure.        Physician Signature: Electronically signed by Moriah Peterson MD, General Surgery    Send copy of H&P to PCP, Rangel Amanda DO and referring physician, Crissy Thomas*

## 2022-12-20 ENCOUNTER — TELEPHONE (OUTPATIENT)
Dept: SURGERY | Age: 52
End: 2022-12-20

## 2022-12-20 PROBLEM — Z12.11 SPECIAL SCREENING FOR MALIGNANT NEOPLASMS, COLON: Status: ACTIVE | Noted: 2022-12-20

## 2022-12-20 NOTE — TELEPHONE ENCOUNTER
Prior Authorization Form:      DEMOGRAPHICS:                     Patient Name:  Spring Kemp  Patient :  1970            Insurance:  Payor: [employer] / Plan: [employer] [address] / Product Type: *No Product type* /   Insurance ID Number:    Payer/Plan Subscr  Sex Relation Sub. Ins. ID Effective Group Num   1. [address]* 1970 Female Self [id number]                                   [address]   [address]* [birth year]/1969 Male Spouse [id number] 3/7/22                                    [address]         DIAGNOSIS & PROCEDURE:                       Procedure/Operation: COLONOSCOPY           CPT Code: 62906    Diagnosis:  SCREENING    ICD10 Code: Z12.11    [facility]:  [facility]    Surgeon:  Sherie Yeager INFORMATION:                          Date: 2023    Time: 10:30              Anesthesia:  MAC/TIVA                                                       Status:  Outpatient        Special Comments:         Electronically signed by Cristela Eaton MA on 2022 at 9:22 AM

## 2023-01-19 PROBLEM — Z12.11 SPECIAL SCREENING FOR MALIGNANT NEOPLASMS, COLON: Status: RESOLVED | Noted: 2022-12-20 | Resolved: 2023-01-19

## 2023-01-24 NOTE — PROGRESS NOTES
[facility] PRE-ADMISSION TESTING INSTRUCTIONS      ARRIVAL INSTRUCTIONS:  [x] Parking the day of Surgery is located in the Main Entrance lot. Upon entering the main door make an immediate right to the surgery reception desk. [x] Bring photo ID and insurance card    [] Bring in a copy of Living will or Durable Power of  papers. [x] Please be sure to arrange for responsible adult to provide transportation to and from the hospital    [x] Please arrange for responsible adult to be with you for the 24 hour period post procedure due to having anesthesia    [x] If you awake am of surgery not feeling well or have temperature >100 please call [phone]    GENERAL INSTRUCTIONS:    [x] Nothing by mouth after midnight, including gum, candy, mints or water    [x] You may brush your teeth, but do not swallow any water    [x] Take medications as instructed with 1-2 oz of water    [x] Stop herbal supplements and vitamins 5 days prior to procedure    [x] Follow preop dosing of blood thinners per physician instructions    [] Take 1/2 dose of evening insulin, but no insulin after midnight    [] No oral diabetic medications after midnight    [] If diabetic and have low blood sugar or feel symptomatic, take 1-2oz apple juice only    [] Bring inhalers day of surgery    [] Bring C-PAP/ Bi-Pap day of surgery    [x] Bring urine specimen day of surgery    [x] Shower or bath with soap, lather and rinse well, AM of Surgery, no lotion, powders or creams to surgical site    [x] Follow bowel prep as instructed per surgeon    [x] No tobacco products within 24 hours of surgery     [x] No alcohol or illegal drug use within 24 hours of surgery.     [x] Jewelry, body piercing's, eyeglasses, contact lenses and dentures are not permitted into surgery (bring cases)      [x] Please do not wear any nail polish, make up or hair products on the day of surgery    [x] You can expect a call the business day prior to procedure to notify you if your arrival time changes    [x] If you receive a survey after surgery we would greatly appreciate your comments    [x] Please notify surgeon if you develop any illness between now and time of surgery (cold, cough, sore throat, fever, nausea, vomiting) or any signs of infections  including skin, wounds, and dental.    []  The Outpatient Pharmacy is available to fill your prescription here on your day of surgery, ask your preop nurse for details

## 2023-01-27 ENCOUNTER — HOSPITAL ENCOUNTER (OUTPATIENT)
Age: 53
Setting detail: OUTPATIENT SURGERY
Discharge: HOME | End: 2023-01-27
Attending: SURGERY | Admitting: SURGERY
Payer: COMMERCIAL

## 2023-01-27 ENCOUNTER — ANESTHESIA (OUTPATIENT)
Dept: ENDOSCOPY | Age: 53
End: 2023-01-27
Payer: COMMERCIAL

## 2023-01-27 ENCOUNTER — ANESTHESIA EVENT (OUTPATIENT)
Dept: ENDOSCOPY | Age: 53
End: 2023-01-27
Payer: COMMERCIAL

## 2023-01-27 VITALS
SYSTOLIC BLOOD PRESSURE: 102 MMHG | TEMPERATURE: 97.4 F | DIASTOLIC BLOOD PRESSURE: 62 MMHG | WEIGHT: 188 LBS | HEIGHT: 68 IN | RESPIRATION RATE: 20 BRPM | OXYGEN SATURATION: 100 % | BODY MASS INDEX: 28.49 KG/M2 | HEART RATE: 77 BPM

## 2023-01-27 DIAGNOSIS — Z12.11 SPECIAL SCREENING FOR MALIGNANT NEOPLASMS, COLON: ICD-10-CM

## 2023-01-27 LAB
HCG, URINE, POC: NEGATIVE
Lab: NORMAL
NEGATIVE QC PASS/FAIL: NORMAL
POSITIVE QC PASS/FAIL: NORMAL

## 2023-01-27 PROCEDURE — 2709999900 HC NON-CHARGEABLE SUPPLY: Performed by: SURGERY

## 2023-01-27 PROCEDURE — 2500000003 HC RX 250 WO HCPCS

## 2023-01-27 PROCEDURE — 3609027000 HC COLONOSCOPY: Performed by: SURGERY

## 2023-01-27 PROCEDURE — 45378 DIAGNOSTIC COLONOSCOPY: CPT | Performed by: SURGERY

## 2023-01-27 PROCEDURE — 3700000001 HC ADD 15 MINUTES (ANESTHESIA): Performed by: SURGERY

## 2023-01-27 PROCEDURE — 7100000011 HC PHASE II RECOVERY - ADDTL 15 MIN: Performed by: SURGERY

## 2023-01-27 PROCEDURE — 6360000002 HC RX W HCPCS

## 2023-01-27 PROCEDURE — 7100000010 HC PHASE II RECOVERY - FIRST 15 MIN: Performed by: SURGERY

## 2023-01-27 PROCEDURE — 3700000000 HC ANESTHESIA ATTENDED CARE: Performed by: SURGERY

## 2023-01-27 PROCEDURE — 2580000003 HC RX 258

## 2023-01-27 RX ORDER — SODIUM CHLORIDE 9 MG/ML
INJECTION, SOLUTION INTRAVENOUS CONTINUOUS PRN
Status: DISCONTINUED | OUTPATIENT
Start: 2023-01-27 | End: 2023-01-27 | Stop reason: SDUPTHER

## 2023-01-27 RX ORDER — SODIUM CHLORIDE 9 MG/ML
INJECTION, SOLUTION INTRAVENOUS CONTINUOUS
Status: DISCONTINUED | OUTPATIENT
Start: 2023-01-27 | End: 2023-01-27 | Stop reason: HOSPADM

## 2023-01-27 RX ORDER — LIDOCAINE HYDROCHLORIDE 20 MG/ML
INJECTION, SOLUTION INFILTRATION; PERINEURAL PRN
Status: DISCONTINUED | OUTPATIENT
Start: 2023-01-27 | End: 2023-01-27 | Stop reason: SDUPTHER

## 2023-01-27 RX ORDER — PROPOFOL 10 MG/ML
INJECTION, EMULSION INTRAVENOUS PRN
Status: DISCONTINUED | OUTPATIENT
Start: 2023-01-27 | End: 2023-01-27 | Stop reason: SDUPTHER

## 2023-01-27 RX ADMIN — LIDOCAINE HYDROCHLORIDE 40 MG: 20 INJECTION, SOLUTION INFILTRATION; PERINEURAL at 08:47

## 2023-01-27 RX ADMIN — PROPOFOL 240 MG: 10 INJECTION, EMULSION INTRAVENOUS at 08:47

## 2023-01-27 RX ADMIN — SODIUM CHLORIDE: 9 INJECTION, SOLUTION INTRAVENOUS at 08:43

## 2023-01-27 ASSESSMENT — PAIN - FUNCTIONAL ASSESSMENT: PAIN_FUNCTIONAL_ASSESSMENT: 0-10

## 2023-01-27 ASSESSMENT — PAIN SCALES - GENERAL
PAINLEVEL_OUTOF10: 0

## 2023-01-27 ASSESSMENT — LIFESTYLE VARIABLES: SMOKING_STATUS: 0

## 2023-01-27 NOTE — ANESTHESIA POSTPROCEDURE EVALUATION
Department of Anesthesiology  Postprocedure Note    Patient: Adilene Chung  MRN: [id number]  YOB: 1970  Date of evaluation: 1/27/2023      Procedure Summary     Date: 01/27/23 Room / Location: [facility] / [facility]    Anesthesia Start: 8097 Anesthesia Stop: 0529    Procedure: COLORECTAL CANCER SCREENING, NOT HIGH RISK Diagnosis:       Special screening for malignant neoplasms, colon      (Special screening for malignant neoplasms, colon [Z12.11])    Surgeons: Adwoa Landin MD Responsible Provider: Graciela Santos MD    Anesthesia Type: MAC ASA Status: 2          Anesthesia Type: No value filed.     Elizabeth Phase I: Elizabeth Score: 10    Elizabeth Phase II:        Anesthesia Post Evaluation    Patient location during evaluation: bedside (Endo)  Patient participation: complete - patient participated  Level of consciousness: awake  Pain score: 0  Airway patency: patent  Nausea & Vomiting: no nausea and no vomiting  Complications: no  Cardiovascular status: blood pressure returned to baseline and hemodynamically stable  Respiratory status: acceptable  Hydration status: euvolemic

## 2023-01-27 NOTE — DISCHARGE INSTRUCTIONS
[facility] [employer] [employer] Colonoscopy PROCEDURE DISCHARGE INSTRUCTIONS  You may be drowsy or lightheaded after receiving sedation or anesthesia. A responsible person should be with you for the next 24 hours. Please follow the instructions checked below:    DIET INSTRUCTIONS:  [x]Start with light diet and progress to your normal diet as you feel like eating. If you experience nausea or repeated episodes of vomiting which persist beyond 12-24 hours, notify your doctor. []Other     ACTIVITY INSTRUCTIONS:  [x]Rest today. Increase activity as tolerated    []No heavy lifting or strenuous activity     [x]No driving for today  []Other      MEDICATION INSTRUCTIONS:    []Prescriptions sent with you. Use as directed. When taking pain medications, you may experience dizziness or drowsiness. Do not drink alcohol or drive when taking these medications. [x]Continue preop medications                               Post-procedure Care   If any tissue was removed: It will be sent to a lab to be examined. It may take 1-2 weeks for results. The doctor will usually give an initial report after the scope is removed. Other tests may be recommended. A small amount of bleeding may occur during the first few days after the procedure. When you return home after the procedure, be sure to follow your doctor's instructions, which may include:   Resume medicines as instructed by your doctor. Resume normal diet, unless directed otherwise by your doctor. The sedative will make you drowsy. Avoid driving, operating machinery, or making important decisions for the rest of the day. Rest for the remainder of the day. After arriving home, contact your doctor if any of the following occurs:   Bleeding from your rectum, notify your doctor if you pass a teaspoonful of blood or more.    Black, tarry stools   Severe abdominal pain   Hard, swollen abdomen   Signs of infection, including fever or chills   Inability to pass gas or stool   Coughing, shortness of breath, chest pain, severe nausea or vomiting     In case of an emergency, CALL 911 . FOLLOW-UP CARE:  [x]Call the office at [phone] for follow-up appointment as needed    Repeat colonoscopy in 7-10 years.

## 2023-01-27 NOTE — H&P
Patient's office history and physical was reviewed. Patient examined. There has been no change in the patient's history and physical.      Physician Signature: Electronically signed by Dr. Olimpia Hernandez Surgery History and Physical     Patient's Name/Date of Birth: Anat Gracia / 1970     Date: 1/27/23     PCP: Earline Rao DO     Referring Physician:   Samm Mackenzie*  [phone]     CHIEF COMPLAINT:        Chief Complaint   Patient presents with    Colonoscopy            HISTORY OF PRESENT ILLNESS:     Anat Gracia is an 47 y.o. female who presents for a colonoscopy. The patient denies any symptoms. No nausea, vomiting, diarrhea, constipation. No changes in stool caliber. No bloody or black stools. No abdominal pain. No unintentional weight loss. No family history of colon cancer. The patient has a known history of: no known risk factors. The patient has had a colonoscopy before - 10 years ago and was normal per the patient. Past Medical History:   Past Medical History        Past Medical History:   Diagnosis Date    Allergic rhinitis      COVID-19 virus infection 11/25/2021    Depression      Hyperlipidemia              Past Surgical History:   Past Surgical History         Past Surgical History:   Procedure Laterality Date    TONSILLECTOMY                Allergies: Patient has no known allergies.       Medications:   Current Facility-Administered Medications          Current Outpatient Medications   Medication Sig Dispense Refill    rosuvastatin (CRESTOR) 5 MG tablet Take 1 tablet by mouth daily 30 tablet 3    ALPRAZolam (XANAX) 0.5 MG tablet Take 1 tablet by mouth daily as needed for AnxietY 15 tablet 0    buPROPion (WELLBUTRIN XL) 300 MG extended release tablet take 1 tablet by mouth every morning 90 tablet 1    montelukast (SINGULAIR) 10 MG tablet take 1 tablet by mouth nightly 90 tablet 1    diphenhydrAMINE HCl (BENADRYL ALLERGY PO) diphenhydrAMINE-APAP, sleep, (TYLENOL PM EXTRA STRENGTH PO)          Cetirizine HCl (ZYRTEC PO) Take by mouth        vitamin E 1000 units capsule Take 1,000 Units by mouth daily        levonorgestrel (MIRENA) IUD 52 mg 1 each by Intrauterine route once        Probiotic Product (PROBIOTIC-10) CAPS Take by mouth        BLACK COHOSH PO Take by mouth         fluticasone (FLONASE) 50 MCG/ACT nasal spray 1 spray by Nasal route daily          No current facility-administered medications for this visit. Social History:   Social History           Tobacco Use    Smoking status: Never    Smokeless tobacco: Never   Substance Use Topics    Alcohol use: No         Family History:   Family History         Family History   Problem Relation Age of Onset    Breast Cancer Maternal Grandmother      Other Father           MULTIPLE MYELOMA    Migraines Mother      Hypertension Brother      Diabetes Brother              REVIEW OF SYSTEMS:    Constitutional: negative  Eyes: negative  Ears, nose, mouth, throat, and face: negative  Respiratory: negative  Cardiovascular: negative  Gastrointestinal: as in HPI  Genitourinary:negative  Integument/breast: negative  Hematologic/lymphatic: negative  Musculoskeletal:negative  Neurological: negative  Allergic/Immunologic: negative     PHYSICAL EXAM   /77   Pulse 79   Temp 97.1 °F (36.2 °C) (Infrared)   Resp 18   Ht 5' 8\" (1.727 m)   Wt 188 lb (85.3 kg)   SpO2 94%   BMI 28.59 kg/m²      General appearance: alert, cooperative and in no acute distress. Eyes: Grossly normal   Lungs: normal work of breathing  Heart: regular rate  Abdomen:  soft, non-tender, non-distended  Skin: No skin abnormalities  Neurologic: Alert and oriented x 3. Grossly normal  Musculoskeletal: No edema.         ASSESSMENT AND PLAN:     Nicanor Hernandez is an 47 y.o. female who presents for a colonoscopy for screening     I will set the patient up for a colonoscopy, possible biopsy, possible polypectomy. I explained the risks including but not limited to bleeding, perforation leading to possible surgery, or infection. The benefits, alternatives, and potential complications associated with the above procedure to be performed and transfusions when applicable with the patient/responsible person prior to the procedure.          Physician Signature: Electronically signed by Nohemy Gray MD, General Surgery     Send copy of H&P to PCP, Ruslan Epstein DO and referring physician, Kian Thomas

## 2023-01-27 NOTE — OP NOTE
Operative Note: Colonoscopy    Rodolfo Ser     DATE OF PROCEDURE: 1/27/2023  SURGEON: Dr. Hong Maradiaga MD, M.D. PREOPERATIVE DIAGNOSES:    Screening    POSTOPERATIVE DIAGNOSES:  Normal appearing colon    SPECIMENS:  * No specimens in log *     OPERATION:   Colonoscopy to the cecum      ANESTHESIA: LMAC    COMPLICATIONS: None. BLOOD LOSS: Minimal    Procedure Note:    CONSENT AND INDICATIONS:  This is a 47y.o. year old female who is having a screening colonoscopy today. I have discussed with the patient and/or the patient representative the indication, alternatives, and the possible risks and/or complications of the planned procedure and the anesthesia methods. The patient and/or patient representative appear to understand and agree to proceed. OPERATIONS: Bowel prep was done yesterday until the bowels were clear. The patient was placed on the table and sedated by anesthesia. A rectal exam was performed and no mass was felt. A lubricated scope was passed into the rectum which looked normal.  The scope was passed all the way around through the sigmoid, descending, transverse and ascending colon to the cecum. The bowel prep was clear. No abnormalities were seen. The cecum was identified by the appendiceal orifice, ileocecal valve, and light reflex in the RLQ. The scope was then slowly withdrawn, each area was examined again on the way out. The scope was retroflexed in the rectum and it was normal . The patient tolerated the procedure well. PLAN:    Follow up colonoscopy in 10 years. Physician Signature: Electronically signed by Dr. Hong Maradiaga MD MHugoD. FACS    Send copy of H&P to PCP, Cara Victor DO and referring physician, No ref.  provider found

## 2023-01-27 NOTE — ANESTHESIA PRE PROCEDURE
Department of Anesthesiology  Preprocedure Note       Name:  Torsten Ballard   Age:  47 y.o.  :  1970                                          MRN:  [id number]         Date:  2023      Surgeon: Jose M Grove):  Jaylen Louie MD    Procedure: Procedure(s):  COLORECTAL CANCER SCREENING, NOT HIGH RISK    Medications prior to admission:   Prior to Admission medications    Medication Sig Start Date End Date Taking?  Authorizing Provider   rosuvastatin (CRESTOR) 5 MG tablet Take 1 tablet by mouth daily 22   Tushar Harrison, DO   ALPRAZolam Cordella Loffler) 0.5 MG tablet Take 1 tablet by mouth daily as needed for AnxietY 11/10/22 5/9/23  Tushar Harrison, DO   buPROPion (WELLBUTRIN XL) 300 MG extended release tablet take 1 tablet by mouth every morning 22   Tushar Harrison, DO   montelukast (SINGULAIR) 10 MG tablet take 1 tablet by mouth nightly 22   Tushar Harrison, DO   diphenhydrAMINE HCl (BENADRYL ALLERGY PO)  21   Historical Provider, MD   diphenhydrAMINE-APAP, sleep, (TYLENOL PM EXTRA STRENGTH PO)  21   Historical Provider, MD   Cetirizine HCl (ZYRTEC PO) Take by mouth    Historical Provider, MD   vitamin E 1000 units capsule Take 1,000 Units by mouth daily    Historical Provider, MD   levonorgestrel (MIRENA) IUD 52 mg 1 each by Intrauterine route once    Historical Provider, MD   Probiotic Product (PROBIOTIC-10) CAPS Take by mouth    Historical Provider, MD   BLACK COHOSH PO Take by mouth     Historical Provider, MD   fluticasone (FLONASE) 50 MCG/ACT nasal spray 1 spray by Nasal route daily    Historical Provider, MD       Current medications:    Current Facility-Administered Medications   Medication Dose Route Frequency Provider Last Rate Last Admin    0.9 % sodium chloride infusion   IntraVENous Continuous Cristela Bailey MD           Allergies:  No Known Allergies    Problem List:    Patient Active Problem List   Diagnosis Code    Chronic non-seasonal allergic rhinitis J30.89    Dysthymia F34.1    Pure hypercholesterolemia E78.00       Past Medical History:        Diagnosis Date    Allergic rhinitis     COVID-19 virus infection 11/25/2021    Depression     Hyperlipidemia     IUD (intrauterine device) in place        Past Surgical History:        Procedure Laterality Date    TONSILLECTOMY         Social History:    Social History     Tobacco Use    Smoking status: Never    Smokeless tobacco: Never   Substance Use Topics    Alcohol use: No                                Counseling given: Not Answered      Vital Signs (Current):   Vitals:    01/24/23 1607 01/27/23 0723   Weight: 188 lb (85.3 kg) 188 lb (85.3 kg)   Height: 5' 8\" (1.727 m) 5' 8\" (1.727 m)                                              BP Readings from Last 3 Encounters:   12/19/22 139/77   11/22/22 120/72   08/18/22 113/71       NPO Status:                                                                                 BMI:   Wt Readings from Last 3 Encounters:   01/27/23 188 lb (85.3 kg)   12/19/22 188 lb (85.3 kg)   11/22/22 187 lb (84.8 kg)     Body mass index is 28.59 kg/m².     CBC:   Lab Results   Component Value Date/Time    WBC 7.0 11/22/2022 12:00 PM    RBC 5.11 11/22/2022 12:00 PM    HGB 14.0 11/22/2022 12:00 PM    HCT 43.8 11/22/2022 12:00 PM    MCV 85.7 11/22/2022 12:00 PM    RDW 13.7 11/22/2022 12:00 PM     11/22/2022 12:00 PM       CMP:   Lab Results   Component Value Date/Time     11/22/2022 12:00 PM    K 4.2 11/22/2022 12:00 PM     11/22/2022 12:00 PM    CO2 26 11/22/2022 12:00 PM    BUN 13 11/22/2022 12:00 PM    CREATININE 1.0 11/22/2022 12:00 PM    GFRAA >60 12/14/2021 08:17 AM    LABGLOM >60 11/22/2022 12:00 PM    GLUCOSE 107 11/22/2022 12:00 PM    PROT 7.7 11/22/2022 12:00 PM    CALCIUM 9.7 11/22/2022 12:00 PM    BILITOT 0.4 11/22/2022 12:00 PM    ALKPHOS 87 11/22/2022 12:00 PM    AST 22 11/22/2022 12:00 PM    ALT 16 11/22/2022 12:00 PM POC Tests: No results for input(s): POCGLU, POCNA, POCK, POCCL, POCBUN, POCHEMO, POCHCT in the last 72 hours. Coags:   Lab Results   Component Value Date/Time    PROTIME 11.1 12/06/2014 12:28 PM    INR 1.1 12/06/2014 12:28 PM    APTT 32.0 12/06/2014 12:28 PM       HCG (If Applicable): No results found for: PREGTESTUR, PREGSERUM, HCG, HCGQUANT     ABGs: No results found for: PHART, PO2ART, WYH3XRH, FPC5ARN, BEART, J0ICZWRK     Type & Screen (If Applicable):  No results found for: LABABO, LABRH    Drug/Infectious Status (If Applicable):  No results found for: HIV, HEPCAB    COVID-19 Screening (If Applicable):   Lab Results   Component Value Date/Time    COVID19 Detected 06/07/2022 08:11 AM    COVID19 Detected 12/03/2021 11:01 AM           Anesthesia Evaluation  Patient summary reviewed and Nursing notes reviewed no history of anesthetic complications:   Airway: Mallampati: II  TM distance: >3 FB   Neck ROM: full  Mouth opening: > = 3 FB   Dental:    (+) caps      Pulmonary:normal exam        (-) not a current smoker                           Cardiovascular:  Exercise tolerance: good (>4 METS),   (+) hyperlipidemia        Rhythm: regular  Rate: normal           Beta Blocker:  Not on Beta Blocker         Neuro/Psych:   (+) depression/anxiety             GI/Hepatic/Renal:   (+) bowel prep,           Endo/Other: Negative Endo/Other ROS                    Abdominal:             Vascular: negative vascular ROS. Other Findings:           Anesthesia Plan      MAC     ASA 2             Anesthetic plan and risks discussed with patient and spouse. Angelica Mcrae MD   1/27/2023    Chart reviewed and patient assessed. Agreed with above note.  Katherine SELF CRNA

## 2023-03-06 RX ORDER — BUPROPION HYDROCHLORIDE 300 MG/1
TABLET ORAL
Qty: 90 TABLET | Refills: 1 | Status: SHIPPED | OUTPATIENT
Start: 2023-03-06

## 2023-03-06 RX ORDER — MONTELUKAST SODIUM 10 MG/1
TABLET ORAL
Qty: 90 TABLET | Refills: 1 | Status: SHIPPED | OUTPATIENT
Start: 2023-03-06

## 2023-04-02 DIAGNOSIS — E78.00 PURE HYPERCHOLESTEROLEMIA: ICD-10-CM

## 2023-04-02 DIAGNOSIS — F34.1 DYSTHYMIA: ICD-10-CM

## 2023-04-03 RX ORDER — ROSUVASTATIN CALCIUM 5 MG/1
TABLET, COATED ORAL
Qty: 30 TABLET | Refills: 3 | Status: SHIPPED | OUTPATIENT
Start: 2023-04-03

## 2023-04-03 RX ORDER — CITALOPRAM 20 MG/1
TABLET ORAL
Qty: 30 TABLET | Refills: 4 | OUTPATIENT
Start: 2023-04-03

## 2023-04-06 DIAGNOSIS — F34.1 DYSTHYMIA: ICD-10-CM

## 2023-04-06 RX ORDER — CITALOPRAM 20 MG/1
TABLET ORAL
Qty: 30 TABLET | Refills: 4 | OUTPATIENT
Start: 2023-04-06

## 2023-05-22 DIAGNOSIS — F34.1 DYSTHYMIA: ICD-10-CM

## 2023-05-22 RX ORDER — CITALOPRAM 20 MG/1
TABLET ORAL
Qty: 30 TABLET | Refills: 4 | OUTPATIENT
Start: 2023-05-22

## 2023-05-30 DIAGNOSIS — E78.00 PURE HYPERCHOLESTEROLEMIA: ICD-10-CM

## 2023-05-30 RX ORDER — ROSUVASTATIN CALCIUM 5 MG/1
5 TABLET, COATED ORAL DAILY
Qty: 90 TABLET | Refills: 1 | Status: SHIPPED | OUTPATIENT
Start: 2023-05-30 | End: 2023-11-26

## 2023-05-30 RX ORDER — CITALOPRAM 20 MG/1
20 TABLET ORAL DAILY
Qty: 90 TABLET | Refills: 1 | Status: SHIPPED | OUTPATIENT
Start: 2023-05-30 | End: 2023-11-26

## 2023-05-30 RX ORDER — CITALOPRAM 20 MG/1
20 TABLET ORAL DAILY
COMMUNITY
Start: 2023-03-04 | End: 2023-05-30 | Stop reason: SDUPTHER

## 2023-06-05 RX ORDER — BUPROPION HYDROCHLORIDE 300 MG/1
TABLET ORAL
Qty: 90 TABLET | Refills: 1 | Status: SHIPPED
Start: 2023-06-05 | End: 2023-06-07 | Stop reason: SDUPTHER

## 2023-06-07 RX ORDER — MONTELUKAST SODIUM 10 MG/1
10 TABLET ORAL NIGHTLY
Qty: 90 TABLET | Refills: 1 | Status: SHIPPED | OUTPATIENT
Start: 2023-06-07

## 2023-06-07 RX ORDER — BUPROPION HYDROCHLORIDE 300 MG/1
300 TABLET ORAL EVERY MORNING
Qty: 90 TABLET | Refills: 1 | Status: SHIPPED | OUTPATIENT
Start: 2023-06-07

## 2023-08-21 LAB — MAMMOGRAPHY, EXTERNAL: NORMAL

## 2023-10-27 ENCOUNTER — TELEPHONE (OUTPATIENT)
Dept: PRIMARY CARE CLINIC | Age: 53
End: 2023-10-27

## 2023-10-27 NOTE — TELEPHONE ENCOUNTER
----- Message from Paramjit Lai sent at 10/27/2023 11:11 AM EDT -----  Subject: Message to Provider    QUESTIONS  Information for Provider? PT has physical in December. She would like to   know when she needs to do her labs. Before? Or at at the appt?   ---------------------------------------------------------------------------  --------------  Libra RIGGS  [phone]; OK to leave message on voicemail  ---------------------------------------------------------------------------  --------------  SCRIPT ANSWERS  Relationship to Patient?  Self

## 2023-10-30 ENCOUNTER — TELEPHONE (OUTPATIENT)
Dept: PRIMARY CARE CLINIC | Age: 53
End: 2023-10-30

## 2023-10-30 NOTE — TELEPHONE ENCOUNTER
----- Message from Brittany Chua sent at 10/27/2023 11:11 AM EDT -----  Subject: Message to Provider    QUESTIONS  Information for Provider? PT has physical in December. She would like to   know when she needs to do her labs. Before? Or at at the appt?   ---------------------------------------------------------------------------  --------------  Stambaugh Gloss INFO  [phone]; OK to leave message on voicemail  ---------------------------------------------------------------------------  --------------  SCRIPT ANSWERS  Relationship to Patient?  Self

## 2023-11-06 DIAGNOSIS — E78.00 PURE HYPERCHOLESTEROLEMIA: ICD-10-CM

## 2023-11-07 RX ORDER — ROSUVASTATIN CALCIUM 5 MG/1
5 TABLET, COATED ORAL DAILY
Qty: 90 TABLET | Refills: 0 | Status: SHIPPED | OUTPATIENT
Start: 2023-11-07

## 2023-11-19 ENCOUNTER — OFFICE VISIT (OUTPATIENT)
Dept: FAMILY MEDICINE CLINIC | Age: 53
End: 2023-11-19

## 2023-11-19 ENCOUNTER — HOSPITAL ENCOUNTER (EMERGENCY)
Age: 53
Discharge: HOME | End: 2023-11-19
Attending: EMERGENCY MEDICINE
Payer: COMMERCIAL

## 2023-11-19 ENCOUNTER — APPOINTMENT (OUTPATIENT)
Dept: CT IMAGING | Age: 53
End: 2023-11-19
Payer: COMMERCIAL

## 2023-11-19 VITALS
DIASTOLIC BLOOD PRESSURE: 98 MMHG | OXYGEN SATURATION: 97 % | TEMPERATURE: 98.1 F | WEIGHT: 188 LBS | RESPIRATION RATE: 16 BRPM | HEART RATE: 68 BPM | HEIGHT: 68 IN | BODY MASS INDEX: 28.49 KG/M2 | SYSTOLIC BLOOD PRESSURE: 143 MMHG

## 2023-11-19 VITALS
HEART RATE: 85 BPM | OXYGEN SATURATION: 95 % | TEMPERATURE: 97.4 F | BODY MASS INDEX: 28.61 KG/M2 | SYSTOLIC BLOOD PRESSURE: 116 MMHG | WEIGHT: 188.8 LBS | RESPIRATION RATE: 16 BRPM | HEIGHT: 68 IN | DIASTOLIC BLOOD PRESSURE: 84 MMHG

## 2023-11-19 DIAGNOSIS — K52.9 GASTROENTERITIS: Primary | ICD-10-CM

## 2023-11-19 DIAGNOSIS — K92.1 BLOOD IN STOOL: ICD-10-CM

## 2023-11-19 DIAGNOSIS — R11.2 NAUSEA AND VOMITING, UNSPECIFIED VOMITING TYPE: Primary | ICD-10-CM

## 2023-11-19 DIAGNOSIS — R10.30 LOWER ABDOMINAL PAIN: ICD-10-CM

## 2023-11-19 LAB
ALBUMIN SERPL-MCNC: 5 G/DL (ref 3.5–5.2)
ALP SERPL-CCNC: 102 U/L (ref 35–104)
ALT SERPL-CCNC: 25 U/L (ref 0–32)
ANION GAP SERPL CALCULATED.3IONS-SCNC: 11 MMOL/L (ref 7–16)
AST SERPL-CCNC: 22 U/L (ref 0–31)
BACTERIA URNS QL MICRO: ABNORMAL
BASOPHILS # BLD: 0.04 K/UL (ref 0–0.2)
BASOPHILS NFR BLD: 0 % (ref 0–2)
BILIRUB SERPL-MCNC: 0.6 MG/DL (ref 0–1.2)
BILIRUB UR QL STRIP: ABNORMAL
BUN SERPL-MCNC: 12 MG/DL (ref 6–20)
CALCIUM SERPL-MCNC: 9.9 MG/DL (ref 8.6–10.2)
CHLORIDE SERPL-SCNC: 104 MMOL/L (ref 98–107)
CLARITY UR: CLEAR
CO2 SERPL-SCNC: 27 MMOL/L (ref 22–29)
COLOR UR: YELLOW
CREAT SERPL-MCNC: 0.9 MG/DL (ref 0.5–1)
EOSINOPHIL # BLD: 0.02 K/UL (ref 0.05–0.5)
EOSINOPHILS RELATIVE PERCENT: 0 % (ref 0–6)
ERYTHROCYTE [DISTWIDTH] IN BLOOD BY AUTOMATED COUNT: 13.3 % (ref 11.5–15)
GFR SERPL CREATININE-BSD FRML MDRD: >60 ML/MIN/1.73M2
GLUCOSE SERPL-MCNC: 134 MG/DL (ref 74–99)
GLUCOSE UR STRIP-MCNC: NEGATIVE MG/DL
HCG UR QL: NEGATIVE
HCT VFR BLD AUTO: 45.4 % (ref 34–48)
HEMOCCULT STL QL: NORMAL
HGB BLD-MCNC: 14.8 G/DL (ref 11.5–15.5)
HGB UR QL STRIP.AUTO: ABNORMAL
IMM GRANULOCYTES # BLD AUTO: 0.04 K/UL (ref 0–0.58)
IMM GRANULOCYTES NFR BLD: 0 % (ref 0–5)
INFLUENZA A ANTIBODY: NORMAL
INFLUENZA B ANTIBODY: NORMAL
KETONES UR STRIP-MCNC: 40 MG/DL
LACTATE BLDV-SCNC: 1.4 MMOL/L (ref 0.5–2.2)
LEUKOCYTE ESTERASE UR QL STRIP: NEGATIVE
LIPASE SERPL-CCNC: 26 U/L (ref 13–60)
LYMPHOCYTES NFR BLD: 1.05 K/UL (ref 1.5–4)
LYMPHOCYTES RELATIVE PERCENT: 8 % (ref 20–42)
MCH RBC QN AUTO: 27.9 PG (ref 26–35)
MCHC RBC AUTO-ENTMCNC: 32.6 G/DL (ref 32–34.5)
MCV RBC AUTO: 85.7 FL (ref 80–99.9)
MONOCYTES NFR BLD: 0.73 K/UL (ref 0.1–0.95)
MONOCYTES NFR BLD: 6 % (ref 2–12)
MUCOUS THREADS URNS QL MICRO: PRESENT
NEUTROPHILS NFR BLD: 85 % (ref 43–80)
NEUTS SEG NFR BLD: 10.87 K/UL (ref 1.8–7.3)
NITRITE UR QL STRIP: NEGATIVE
PH UR STRIP: 6.5 [PH] (ref 5–9)
PLATELET # BLD AUTO: 270 K/UL (ref 130–450)
PMV BLD AUTO: 9.7 FL (ref 7–12)
POTASSIUM SERPL-SCNC: 4.1 MMOL/L (ref 3.5–5)
PROT SERPL-MCNC: 8.1 G/DL (ref 6.4–8.3)
PROT UR STRIP-MCNC: ABNORMAL MG/DL
RBC # BLD AUTO: 5.3 M/UL (ref 3.5–5.5)
RBC #/AREA URNS HPF: ABNORMAL /HPF
SODIUM SERPL-SCNC: 142 MMOL/L (ref 132–146)
SP GR UR STRIP: 1.02 (ref 1–1.03)
UROBILINOGEN UR STRIP-ACNC: 1 EU/DL (ref 0–1)
WBC #/AREA URNS HPF: ABNORMAL /HPF
WBC OTHER # BLD: 12.8 K/UL (ref 4.5–11.5)

## 2023-11-19 PROCEDURE — 99285 EMERGENCY DEPT VISIT HI MDM: CPT

## 2023-11-19 PROCEDURE — 74177 CT ABD & PELVIS W/CONTRAST: CPT

## 2023-11-19 PROCEDURE — 96374 THER/PROPH/DIAG INJ IV PUSH: CPT

## 2023-11-19 PROCEDURE — 81001 URINALYSIS AUTO W/SCOPE: CPT

## 2023-11-19 PROCEDURE — 6360000004 HC RX CONTRAST MEDICATION: Performed by: RADIOLOGY

## 2023-11-19 PROCEDURE — 80053 COMPREHEN METABOLIC PANEL: CPT

## 2023-11-19 PROCEDURE — 96375 TX/PRO/DX INJ NEW DRUG ADDON: CPT

## 2023-11-19 PROCEDURE — 6360000002 HC RX W HCPCS

## 2023-11-19 PROCEDURE — 83690 ASSAY OF LIPASE: CPT

## 2023-11-19 PROCEDURE — 2580000003 HC RX 258

## 2023-11-19 PROCEDURE — 84703 CHORIONIC GONADOTROPIN ASSAY: CPT

## 2023-11-19 PROCEDURE — 85025 COMPLETE CBC W/AUTO DIFF WBC: CPT

## 2023-11-19 PROCEDURE — 83605 ASSAY OF LACTIC ACID: CPT

## 2023-11-19 RX ORDER — DIPHENHYDRAMINE HYDROCHLORIDE 50 MG/ML
25 INJECTION INTRAMUSCULAR; INTRAVENOUS ONCE
Status: COMPLETED | OUTPATIENT
Start: 2023-11-19 | End: 2023-11-19

## 2023-11-19 RX ORDER — MORPHINE SULFATE 4 MG/ML
4 INJECTION, SOLUTION INTRAMUSCULAR; INTRAVENOUS ONCE
Status: COMPLETED | OUTPATIENT
Start: 2023-11-19 | End: 2023-11-19

## 2023-11-19 RX ORDER — 0.9 % SODIUM CHLORIDE 0.9 %
1000 INTRAVENOUS SOLUTION INTRAVENOUS ONCE
Status: COMPLETED | OUTPATIENT
Start: 2023-11-19 | End: 2023-11-19

## 2023-11-19 RX ORDER — ONDANSETRON 4 MG/1
4 TABLET, FILM COATED ORAL DAILY PRN
Qty: 30 TABLET | Refills: 0 | Status: SHIPPED | OUTPATIENT
Start: 2023-11-19

## 2023-11-19 RX ADMIN — DIPHENHYDRAMINE HYDROCHLORIDE 25 MG: 50 INJECTION INTRAMUSCULAR; INTRAVENOUS at 17:10

## 2023-11-19 RX ADMIN — SODIUM CHLORIDE 1000 ML: 9 INJECTION, SOLUTION INTRAVENOUS at 17:09

## 2023-11-19 RX ADMIN — MORPHINE SULFATE 4 MG: 4 INJECTION, SOLUTION INTRAMUSCULAR; INTRAVENOUS at 17:10

## 2023-11-19 RX ADMIN — IOPAMIDOL 75 ML: 755 INJECTION, SOLUTION INTRAVENOUS at 16:29

## 2023-11-19 ASSESSMENT — PAIN DESCRIPTION - ORIENTATION: ORIENTATION: LOWER

## 2023-11-19 ASSESSMENT — PAIN SCALES - GENERAL
PAINLEVEL_OUTOF10: 6
PAINLEVEL_OUTOF10: 6

## 2023-11-19 ASSESSMENT — PAIN DESCRIPTION - DESCRIPTORS: DESCRIPTORS: ACHING

## 2023-11-19 ASSESSMENT — PAIN DESCRIPTION - LOCATION: LOCATION: ABDOMEN

## 2023-11-19 NOTE — ED PROVIDER NOTES
discharge at this time with close outpatient follow-up with general surgery. Given patient's reassuring vital signs, nontoxic appearance upon reevaluation at bedside, patient felt to be stable for discharge with close outpatient follow-up at this time. Patient's results were explained, given opportunity to ask questions. Patient is comfortable with discharge plan with close outpatient follow-up. Strict return precautions discussed at length with patient, verbalizes understanding. Patient discharged in stable condition. CONSULTS:   None        PROCEDURES   Unless otherwise noted below, none       CRITICAL CARE TIME (.cct)           I am the Primary Clinician of Record. FINAL IMPRESSION      1.  Gastroenteritis          DISPOSITION/PLAN     DISPOSITION Decision To Discharge 11/19/2023 07:48:12 PM      PATIENT REFERRED TO:  Misha Mendoza MD  [address] [facility] [phone]    Call on 11/20/2023      Sharmila Zhu DO  [address] [phone]    Call in 1 day      [address] [place] [address] Emergency Department  [address] [facility] [place] [id number] [phone]  Go to   As needed, If symptoms worsen      DISCHARGE MEDICATIONS:  Discharge Medication List as of 11/19/2023  8:05 PM          DISCONTINUED MEDICATIONS:  Discharge Medication List as of 11/19/2023  8:05 PM                 (Please note that portions of this note were completed with a voice recognition program.  Efforts were made to edit the dictations but occasionally words are mis-transcribed.)    Germán Drake DO (electronically signed)            Germán Drake DO  Resident  11/19/23 5727

## 2023-11-20 ENCOUNTER — OFFICE VISIT (OUTPATIENT)
Dept: SURGERY | Age: 53
End: 2023-11-20
Payer: COMMERCIAL

## 2023-11-20 VITALS
DIASTOLIC BLOOD PRESSURE: 79 MMHG | TEMPERATURE: 97.6 F | OXYGEN SATURATION: 98 % | SYSTOLIC BLOOD PRESSURE: 122 MMHG | HEIGHT: 68 IN | BODY MASS INDEX: 28.67 KG/M2 | HEART RATE: 77 BPM | WEIGHT: 189.2 LBS

## 2023-11-20 DIAGNOSIS — R10.11 RUQ PAIN: Primary | ICD-10-CM

## 2023-11-20 DIAGNOSIS — R10.11 RUQ ABDOMINAL PAIN: Primary | ICD-10-CM

## 2023-11-20 PROCEDURE — G8419 CALC BMI OUT NRM PARAM NOF/U: HCPCS | Performed by: SURGERY

## 2023-11-20 PROCEDURE — G8484 FLU IMMUNIZE NO ADMIN: HCPCS | Performed by: SURGERY

## 2023-11-20 PROCEDURE — 3017F COLORECTAL CA SCREEN DOC REV: CPT | Performed by: SURGERY

## 2023-11-20 PROCEDURE — 1036F TOBACCO NON-USER: CPT | Performed by: SURGERY

## 2023-11-20 PROCEDURE — G8427 DOCREV CUR MEDS BY ELIG CLIN: HCPCS | Performed by: SURGERY

## 2023-11-20 PROCEDURE — 99213 OFFICE O/P EST LOW 20 MIN: CPT | Performed by: SURGERY

## 2023-11-20 RX ORDER — BUPROPION HYDROCHLORIDE 300 MG/1
300 TABLET ORAL EVERY MORNING
Qty: 90 TABLET | Refills: 0 | Status: SHIPPED | OUTPATIENT
Start: 2023-11-20

## 2023-11-20 RX ORDER — OMEPRAZOLE 20 MG/1
20 CAPSULE, DELAYED RELEASE ORAL
Qty: 14 CAPSULE | Refills: 1 | Status: SHIPPED | OUTPATIENT
Start: 2023-11-20

## 2023-11-20 RX ORDER — MONTELUKAST SODIUM 10 MG/1
10 TABLET ORAL
Qty: 90 TABLET | Refills: 0 | Status: SHIPPED | OUTPATIENT
Start: 2023-11-20

## 2023-11-20 NOTE — DISCHARGE INSTR - COC
Status Date: ***    Readmission Risk Assessment Score:  Readmission Risk              Risk of Unplanned Readmission:  0           Discharging to Facility/ Agency   Name:   Address:  Phone:  Fax:    Dialysis Facility (if applicable)   Name:  Address:  Dialysis Schedule:  Phone:  Fax:    / signature: {Kalagnature:[id number]}    PHYSICIAN SECTION    Prognosis: {Prognosis:6776655429}    Condition at Discharge: [address] Patient Condition:188635769}    Rehab Potential (if transferring to Rehab): {Prognosis:4478908137}    Recommended Labs or Other Treatments After Discharge: ***    Physician Certification: I certify the above information and transfer of Fercho Roman  is necessary for the continuing treatment of the diagnosis listed and that she requires {Admit to Appropriate Level of Care:07020} for {GREATER/LESS:238595600} 30 days.      Update Admission H&P: {CHP DME Changes in RHDUZ:948419969}    PHYSICIAN SIGNATURE:  {Esignature:660999719}

## 2023-11-25 ASSESSMENT — PATIENT HEALTH QUESTIONNAIRE - PHQ9
9. THOUGHTS THAT YOU WOULD BE BETTER OFF DEAD, OR OF HURTING YOURSELF: NOT AT ALL
6. FEELING BAD ABOUT YOURSELF - OR THAT YOU ARE A FAILURE OR HAVE LET YOURSELF OR YOUR FAMILY DOWN: NOT AT ALL
1. LITTLE INTEREST OR PLEASURE IN DOING THINGS: 0
3. TROUBLE FALLING OR STAYING ASLEEP: 0
5. POOR APPETITE OR OVEREATING: 2
7. TROUBLE CONCENTRATING ON THINGS, SUCH AS READING THE NEWSPAPER OR WATCHING TELEVISION: NOT AT ALL
10. IF YOU CHECKED OFF ANY PROBLEMS, HOW DIFFICULT HAVE THESE PROBLEMS MADE IT FOR YOU TO DO YOUR WORK, TAKE CARE OF THINGS AT HOME, OR GET ALONG WITH OTHER PEOPLE: NOT DIFFICULT AT ALL
8. MOVING OR SPEAKING SO SLOWLY THAT OTHER PEOPLE COULD HAVE NOTICED. OR THE OPPOSITE, BEING SO FIGETY OR RESTLESS THAT YOU HAVE BEEN MOVING AROUND A LOT MORE THAN USUAL: 0
2. FEELING DOWN, DEPRESSED OR HOPELESS: 0
SUM OF ALL RESPONSES TO PHQ QUESTIONS 1-9: 2
1. LITTLE INTEREST OR PLEASURE IN DOING THINGS: NOT AT ALL
SUM OF ALL RESPONSES TO PHQ9 QUESTIONS 1 & 2: 0
4. FEELING TIRED OR HAVING LITTLE ENERGY: NOT AT ALL
5. POOR APPETITE OR OVEREATING: MORE THAN HALF THE DAYS
SUM OF ALL RESPONSES TO PHQ QUESTIONS 1-9: 2
SUM OF ALL RESPONSES TO PHQ QUESTIONS 1-9: 2
2. FEELING DOWN, DEPRESSED OR HOPELESS: NOT AT ALL
10. IF YOU CHECKED OFF ANY PROBLEMS, HOW DIFFICULT HAVE THESE PROBLEMS MADE IT FOR YOU TO DO YOUR WORK, TAKE CARE OF THINGS AT HOME, OR GET ALONG WITH OTHER PEOPLE: 0
3. TROUBLE FALLING OR STAYING ASLEEP: NOT AT ALL
8. MOVING OR SPEAKING SO SLOWLY THAT OTHER PEOPLE COULD HAVE NOTICED. OR THE OPPOSITE - BEING SO FIDGETY OR RESTLESS THAT YOU HAVE BEEN MOVING AROUND A LOT MORE THAN USUAL: NOT AT ALL
4. FEELING TIRED OR HAVING LITTLE ENERGY: 0
6. FEELING BAD ABOUT YOURSELF - OR THAT YOU ARE A FAILURE OR HAVE LET YOURSELF OR YOUR FAMILY DOWN: 0
SUM OF ALL RESPONSES TO PHQ QUESTIONS 1-9: 2
SUM OF ALL RESPONSES TO PHQ QUESTIONS 1-9: 2
7. TROUBLE CONCENTRATING ON THINGS, SUCH AS READING THE NEWSPAPER OR WATCHING TELEVISION: 0
9. THOUGHTS THAT YOU WOULD BE BETTER OFF DEAD, OR OF HURTING YOURSELF: 0

## 2023-11-29 ENCOUNTER — HOSPITAL ENCOUNTER (OUTPATIENT)
Dept: ULTRASOUND IMAGING | Age: 53
Discharge: HOME | End: 2023-12-01
Attending: SURGERY
Payer: COMMERCIAL

## 2023-11-29 DIAGNOSIS — R10.11 RUQ ABDOMINAL PAIN: ICD-10-CM

## 2023-11-29 PROCEDURE — 76705 ECHO EXAM OF ABDOMEN: CPT

## 2023-12-01 ENCOUNTER — OFFICE VISIT (OUTPATIENT)
Dept: PRIMARY CARE CLINIC | Age: 53
End: 2023-12-01
Payer: COMMERCIAL

## 2023-12-01 VITALS
TEMPERATURE: 97.8 F | BODY MASS INDEX: 29.19 KG/M2 | DIASTOLIC BLOOD PRESSURE: 70 MMHG | SYSTOLIC BLOOD PRESSURE: 122 MMHG | WEIGHT: 192 LBS | HEART RATE: 65 BPM | OXYGEN SATURATION: 98 %

## 2023-12-01 DIAGNOSIS — Z00.00 ANNUAL PHYSICAL EXAM: ICD-10-CM

## 2023-12-01 DIAGNOSIS — Z00.00 ANNUAL PHYSICAL EXAM: Primary | ICD-10-CM

## 2023-12-01 LAB
ALBUMIN SERPL-MCNC: 4.4 G/DL (ref 3.5–5.2)
ALP BLD-CCNC: 82 U/L (ref 35–104)
ALT SERPL-CCNC: 25 U/L (ref 0–32)
ANION GAP SERPL CALCULATED.3IONS-SCNC: 15 MMOL/L (ref 7–16)
AST SERPL-CCNC: 22 U/L (ref 0–31)
BILIRUB SERPL-MCNC: 0.3 MG/DL (ref 0–1.2)
BUN BLDV-MCNC: 13 MG/DL (ref 6–20)
CALCIUM SERPL-MCNC: 9.2 MG/DL (ref 8.6–10.2)
CHLORIDE BLD-SCNC: 100 MMOL/L (ref 98–107)
CHOLESTEROL: 174 MG/DL
CO2: 23 MMOL/L (ref 22–29)
CREAT SERPL-MCNC: 0.8 MG/DL (ref 0.5–1)
GFR SERPL CREATININE-BSD FRML MDRD: >60 ML/MIN/1.73M2
GLUCOSE BLD-MCNC: 104 MG/DL (ref 74–99)
HCT VFR BLD CALC: 42.5 % (ref 34–48)
HDLC SERPL-MCNC: 49 MG/DL
HEMOGLOBIN: 13.4 G/DL (ref 11.5–15.5)
LDL CHOLESTEROL: 106 MG/DL
MCH RBC QN AUTO: 28 PG (ref 26–35)
MCHC RBC AUTO-ENTMCNC: 31.5 G/DL (ref 32–34.5)
MCV RBC AUTO: 88.9 FL (ref 80–99.9)
PDW BLD-RTO: 13.6 % (ref 11.5–15)
PLATELET # BLD: 247 K/UL (ref 130–450)
PMV BLD AUTO: 10.5 FL (ref 7–12)
POTASSIUM SERPL-SCNC: 4.1 MMOL/L (ref 3.5–5)
RBC # BLD: 4.78 M/UL (ref 3.5–5.5)
SODIUM BLD-SCNC: 138 MMOL/L (ref 132–146)
TOTAL PROTEIN: 7.3 G/DL (ref 6.4–8.3)
TRIGL SERPL-MCNC: 93 MG/DL
VLDLC SERPL CALC-MCNC: 19 MG/DL
WBC # BLD: 5.3 K/UL (ref 4.5–11.5)

## 2023-12-01 PROCEDURE — G8484 FLU IMMUNIZE NO ADMIN: HCPCS | Performed by: FAMILY MEDICINE

## 2023-12-01 PROCEDURE — 99396 PREV VISIT EST AGE 40-64: CPT | Performed by: FAMILY MEDICINE

## 2023-12-01 SDOH — ECONOMIC STABILITY: FOOD INSECURITY: WITHIN THE PAST 12 MONTHS, YOU WORRIED THAT YOUR FOOD WOULD RUN OUT BEFORE YOU GOT MONEY TO BUY MORE.: NEVER TRUE

## 2023-12-01 SDOH — ECONOMIC STABILITY: HOUSING INSECURITY
IN THE LAST 12 MONTHS, WAS THERE A TIME WHEN YOU DID NOT HAVE A STEADY PLACE TO SLEEP OR SLEPT IN A SHELTER (INCLUDING NOW)?: NO

## 2023-12-01 SDOH — ECONOMIC STABILITY: FOOD INSECURITY: WITHIN THE PAST 12 MONTHS, THE FOOD YOU BOUGHT JUST DIDN'T LAST AND YOU DIDN'T HAVE MONEY TO GET MORE.: NEVER TRUE

## 2023-12-01 SDOH — ECONOMIC STABILITY: INCOME INSECURITY: HOW HARD IS IT FOR YOU TO PAY FOR THE VERY BASICS LIKE FOOD, HOUSING, MEDICAL CARE, AND HEATING?: NOT HARD AT ALL

## 2023-12-01 NOTE — PROGRESS NOTES
SUBJECTIVE:    HPI: Srinivas Gold  Was seen here today for   Chief Complaint   Patient presents with    Annual Exam    .  She states they are dealing with recent abd issues with rectal bleeding. Was in ER. Workup ok and sent home. Doing better. Past Medical History:   Diagnosis Date    Allergic rhinitis     COVID-19 virus infection 11/25/2021    Depression     Hyperlipidemia     IUD (intrauterine device) in place        Past Surgical History:   Procedure Laterality Date    COLONOSCOPY N/A 1/27/2023    COLORECTAL CANCER SCREENING, NOT HIGH RISK performed by Shakira Morales MD at [facility] ENDOSCOPY    TONSILLECTOMY         Family History   Problem Relation Age of Onset    Breast Cancer Maternal Grandmother     Other Father         MULTIPLE MYELOMA    Migraines Mother     Hypertension Brother     Diabetes Brother        Prior to Admission medications    Medication Sig Start Date End Date Taking?  Authorizing Provider   buPROPion (WELLBUTRIN XL) 300 MG extended release tablet TAKE 1 TABLET EVERY MORNING 11/20/23   Yuliya Harrison,    montelukast (SINGULAIR) 10 MG tablet TAKE 1 TABLET NIGHTLY 11/20/23   Yuliya Harrison DO   omeprazole (PRILOSEC) 20 MG delayed release capsule Take 1 capsule by mouth every morning (before breakfast) 11/20/23   Alejandro Cook MD   ondansetron (ZOFRAN) 4 MG tablet Take 1 tablet by mouth daily as needed for Nausea or Vomiting  Patient not taking: Reported on 11/20/2023 11/19/23   Juvenal Mendoza,    rosuvastatin (CRESTOR) 5 MG tablet TAKE 1 TABLET DAILY 11/7/23   Yuliya Harrison DO   citalopram (CELEXA) 20 MG tablet Take 1 tablet by mouth daily 5/30/23 11/26/23  Yuliya Harrison DO   diphenhydrAMINE HCl (BENADRYL ALLERGY PO)  6/1/21   ProviderRodríguez MD   diphenhydrAMINE-APAP, sleep, (TYLENOL PM EXTRA STRENGTH PO)  7/1/21   Rodríguez Peñaloza MD   Cetirizine HCl (ZYRTEC PO) Take by mouth    ProviderRodríguez MD   vitamin

## 2023-12-04 DIAGNOSIS — R10.11 RUQ ABDOMINAL PAIN: Primary | ICD-10-CM

## 2023-12-12 ENCOUNTER — HOSPITAL ENCOUNTER (OUTPATIENT)
Dept: NUCLEAR MEDICINE | Age: 53
Discharge: HOME | End: 2023-12-12
Attending: SURGERY
Payer: COMMERCIAL

## 2023-12-12 VITALS — BODY MASS INDEX: 29.2 KG/M2 | WEIGHT: 192.02 LBS

## 2023-12-12 DIAGNOSIS — R10.11 RUQ ABDOMINAL PAIN: ICD-10-CM

## 2023-12-12 PROCEDURE — A9537 TC99M MEBROFENIN: HCPCS | Performed by: RADIOLOGY

## 2023-12-12 PROCEDURE — 3430000000 HC RX DIAGNOSTIC RADIOPHARMACEUTICAL: Performed by: RADIOLOGY

## 2023-12-12 PROCEDURE — 78227 HEPATOBIL SYST IMAGE W/DRUG: CPT

## 2023-12-12 RX ADMIN — Medication 6 MILLICURIE: at 10:59

## 2024-01-29 ENCOUNTER — OFFICE VISIT (OUTPATIENT)
Dept: SURGERY | Age: 54
End: 2024-01-29
Payer: COMMERCIAL

## 2024-01-29 VITALS
DIASTOLIC BLOOD PRESSURE: 77 MMHG | HEIGHT: 68 IN | HEART RATE: 75 BPM | OXYGEN SATURATION: 98 % | SYSTOLIC BLOOD PRESSURE: 122 MMHG | TEMPERATURE: 97.9 F | BODY MASS INDEX: 29.19 KG/M2 | WEIGHT: 192.6 LBS

## 2024-01-29 DIAGNOSIS — R10.11 RUQ PAIN: Primary | ICD-10-CM

## 2024-01-29 PROCEDURE — 99213 OFFICE O/P EST LOW 20 MIN: CPT | Performed by: SURGERY

## 2024-01-29 PROCEDURE — G8484 FLU IMMUNIZE NO ADMIN: HCPCS | Performed by: SURGERY

## 2024-01-29 PROCEDURE — G8419 CALC BMI OUT NRM PARAM NOF/U: HCPCS | Performed by: SURGERY

## 2024-01-29 PROCEDURE — G8427 DOCREV CUR MEDS BY ELIG CLIN: HCPCS | Performed by: SURGERY

## 2024-01-29 PROCEDURE — 3017F COLORECTAL CA SCREEN DOC REV: CPT | Performed by: SURGERY

## 2024-01-29 PROCEDURE — 1036F TOBACCO NON-USER: CPT | Performed by: SURGERY

## 2024-01-29 NOTE — PROGRESS NOTES
Progress Note - Follow up    Patient's Name/Date of Birth: Tk Campbell / 1970    Date: 1/29/2024    PCP: Ahmet Harrison DO    Referring Physician:   Ahmet Harrison*  [phone]    Chief Complaint   Patient presents with    Follow-up     Hida scan 12/12       HPI:    The patient said she is eating and not having any issues. She had no symptoms during her HIDA with ensure. No diarrhea or constipation. No abdominal pain.     Patient's medications, allergies, past medical, surgical, social and family histories were reviewed and updated as appropriate.    Review of Systems  Constitutional: negative  Respiratory: negative  Cardiovascular: negative  Gastrointestinal: as in hpi  Genitourinary:negative  Integument/breast: negative    Physical Exam:  /77 (Site: Right Upper Arm, Position: Sitting, Cuff Size: Medium Adult)   Pulse 75   Temp 97.9 °F (36.6 °C) (Temporal)   Ht 1.727 m (5' 8\")   Wt 87.4 kg (192 lb 9.6 oz)   SpO2 98%   BMI 29.28 kg/m²   General appearance: alert, cooperative and in no acute distress.  Lungs: normal work of breathing  Heart: regular rate  Abdomen:  soft, nontender, nondistended  Musculoskeletal: symmetrical without edema.  Skin: normal     Data Reviewed:   RUQ US:  IMPRESSION:  Mild fatty liver.     No sonographic evidence of cholecystitis.       HIDA with ensure:  Normal value is >33% for Ensure protocol.  Note, Ensure normal range is based  on a limited study.     IMPRESSION:  1.  Gallbladder ejection fraction is 79%.     2.  No acute cholecystitis.    Impression/Plan:  53 y.o. year old female with abdominal pain    All available labs and pathology reviewed.  All imaging independently reviewed and interpreted.   All provider notes reviewed.  The above was discussed with the patient at length.    Recommend gynecology workup    Will proceed with EGD if symptoms recur      Electronically by Cristela Cook MD, General Surgery  on 1/29/2024 at 4:07 PM

## 2024-01-31 RX ORDER — CITALOPRAM 20 MG/1
20 TABLET ORAL DAILY
Qty: 90 TABLET | Refills: 3 | Status: SHIPPED | OUTPATIENT
Start: 2024-01-31

## 2024-02-05 DIAGNOSIS — E78.00 PURE HYPERCHOLESTEROLEMIA: ICD-10-CM

## 2024-02-05 RX ORDER — ROSUVASTATIN CALCIUM 5 MG/1
5 TABLET, COATED ORAL DAILY
Qty: 90 TABLET | Refills: 1 | Status: SHIPPED | OUTPATIENT
Start: 2024-02-05

## 2024-02-16 RX ORDER — BUPROPION HYDROCHLORIDE 300 MG/1
300 TABLET ORAL EVERY MORNING
Qty: 90 TABLET | Refills: 2 | Status: SHIPPED | OUTPATIENT
Start: 2024-02-16

## 2024-02-16 RX ORDER — MONTELUKAST SODIUM 10 MG/1
10 TABLET ORAL
Qty: 90 TABLET | Refills: 2 | Status: SHIPPED | OUTPATIENT
Start: 2024-02-16

## 2024-05-30 ENCOUNTER — HOSPITAL ENCOUNTER (OUTPATIENT)
Age: 54
Discharge: HOME | End: 2024-06-01

## 2024-05-30 LAB
ABO + RH BLD: NORMAL
ARM BAND NUMBER: NORMAL
BLOOD BANK SAMPLE EXPIRATION: NORMAL
BLOOD GROUP ANTIBODIES SERPL: NEGATIVE
ERYTHROCYTE [DISTWIDTH] IN BLOOD BY AUTOMATED COUNT: 13.6 % (ref 11.5–15)
HCT VFR BLD AUTO: 43.3 % (ref 34–48)
HGB BLD-MCNC: 13.9 G/DL (ref 11.5–15.5)
MCH RBC QN AUTO: 27.9 PG (ref 26–35)
MCHC RBC AUTO-ENTMCNC: 32.1 G/DL (ref 32–34.5)
MCV RBC AUTO: 86.9 FL (ref 80–99.9)
PLATELET # BLD AUTO: 226 K/UL (ref 130–450)
PMV BLD AUTO: 10.3 FL (ref 7–12)
RBC # BLD AUTO: 4.98 M/UL (ref 3.5–5.5)
WBC OTHER # BLD: 5.8 K/UL (ref 4.5–11.5)

## 2024-05-30 PROCEDURE — 86901 BLOOD TYPING SEROLOGIC RH(D): CPT

## 2024-05-30 PROCEDURE — 86900 BLOOD TYPING SEROLOGIC ABO: CPT

## 2024-05-30 PROCEDURE — 85027 COMPLETE CBC AUTOMATED: CPT

## 2024-05-30 PROCEDURE — 86850 RBC ANTIBODY SCREEN: CPT

## 2024-06-05 ENCOUNTER — HOSPITAL ENCOUNTER (OUTPATIENT)
Age: 54
Discharge: HOME | End: 2024-06-07

## 2024-06-06 ENCOUNTER — HOSPITAL ENCOUNTER (OUTPATIENT)
Age: 54
Discharge: HOME | End: 2024-06-08

## 2024-06-06 LAB
ANION GAP SERPL CALCULATED.3IONS-SCNC: 10 MMOL/L (ref 7–16)
BUN SERPL-MCNC: 9 MG/DL (ref 6–20)
CALCIUM SERPL-MCNC: 8.9 MG/DL (ref 8.6–10.2)
CHLORIDE SERPL-SCNC: 105 MMOL/L (ref 98–107)
CO2 SERPL-SCNC: 26 MMOL/L (ref 22–29)
CREAT SERPL-MCNC: 0.9 MG/DL (ref 0.5–1)
ERYTHROCYTE [DISTWIDTH] IN BLOOD BY AUTOMATED COUNT: 13.3 % (ref 11.5–15)
GFR, ESTIMATED: 80 ML/MIN/1.73M2
GLUCOSE SERPL-MCNC: 186 MG/DL (ref 74–99)
HCT VFR BLD AUTO: 39.3 % (ref 34–48)
HGB BLD-MCNC: 12.5 G/DL (ref 11.5–15.5)
MCH RBC QN AUTO: 27.9 PG (ref 26–35)
MCHC RBC AUTO-ENTMCNC: 31.8 G/DL (ref 32–34.5)
MCV RBC AUTO: 87.7 FL (ref 80–99.9)
PLATELET # BLD AUTO: 203 K/UL (ref 130–450)
PMV BLD AUTO: 10.4 FL (ref 7–12)
POTASSIUM SERPL-SCNC: 4.4 MMOL/L (ref 3.5–5)
RBC # BLD AUTO: 4.48 M/UL (ref 3.5–5.5)
SODIUM SERPL-SCNC: 141 MMOL/L (ref 132–146)
WBC OTHER # BLD: 9.2 K/UL (ref 4.5–11.5)

## 2024-06-06 PROCEDURE — 80048 BASIC METABOLIC PNL TOTAL CA: CPT

## 2024-06-06 PROCEDURE — 85027 COMPLETE CBC AUTOMATED: CPT

## 2024-06-17 LAB — SURGICAL PATHOLOGY REPORT: NORMAL

## 2024-06-17 RX ORDER — OMEPRAZOLE 20 MG/1
CAPSULE, DELAYED RELEASE ORAL
Refills: 0 | OUTPATIENT
Start: 2024-06-17

## 2024-06-17 RX ORDER — OMEPRAZOLE 20 MG/1
20 CAPSULE, DELAYED RELEASE ORAL
Qty: 90 CAPSULE | Refills: 3 | OUTPATIENT
Start: 2024-06-17 | End: 2024-09-15

## 2024-07-22 ENCOUNTER — OFFICE VISIT (OUTPATIENT)
Dept: SURGERY | Age: 54
End: 2024-07-22
Payer: COMMERCIAL

## 2024-07-22 VITALS
HEIGHT: 68 IN | OXYGEN SATURATION: 93 % | HEART RATE: 83 BPM | BODY MASS INDEX: 28.95 KG/M2 | DIASTOLIC BLOOD PRESSURE: 95 MMHG | TEMPERATURE: 97.8 F | SYSTOLIC BLOOD PRESSURE: 134 MMHG | WEIGHT: 191 LBS

## 2024-07-22 DIAGNOSIS — R10.11 RUQ PAIN: Primary | ICD-10-CM

## 2024-07-22 PROCEDURE — 99214 OFFICE O/P EST MOD 30 MIN: CPT | Performed by: SURGERY

## 2024-07-22 PROCEDURE — G8427 DOCREV CUR MEDS BY ELIG CLIN: HCPCS | Performed by: SURGERY

## 2024-07-22 PROCEDURE — G8419 CALC BMI OUT NRM PARAM NOF/U: HCPCS | Performed by: SURGERY

## 2024-07-22 PROCEDURE — 3017F COLORECTAL CA SCREEN DOC REV: CPT | Performed by: SURGERY

## 2024-07-22 PROCEDURE — 1036F TOBACCO NON-USER: CPT | Performed by: SURGERY

## 2024-07-22 NOTE — PATIENT INSTRUCTIONS
General Surgery     Preoperative Instructions    Please read the following information very carefully. It contains information that is necessary to best prepare you for your upcoming procedure.    Make arrangements for a  to take you to and from your procedure.  Nothing to eat or drink after midnight the night before your procedure.  Follow your bowel prep instructions if you have them for this procedure.    3 days prior to your procedure: Stop taking blood thinners like Coumadin or Plavix or Xarelto.  5 days prior to your procedure: Stop taking Aspirin or Aspirin containing products.   If you cannot stop any of these medications prior to your procedure, please contact our office.    Medications morning of procedure:  Only heart, breathing, blood pressure, and seizure medications are permitted on the morning of your procedure. These medications can be taken with a sip of water.    IF YOU ARE UNABLE TO KEEP THE ABOVE SCHEDULED PROCEDURE, YOU MUST NOTIFY DR. LARSON'S OFFICE [phone]. NOT THE FACILITY.    NO CHEWING GUM OR CHEWING TOBACCO AFTER MIDNIGHT ON DAY OF PROCEDURE.    YOU MUST HAVE TRANSPORTATION TO AND FROM THE FACILITY.

## 2024-07-22 NOTE — PROGRESS NOTES
Progress Note - Follow up    Patient's Name/Date of Birth: Tk Campbell / 1970    Date: 7/22/2024    PCP: Ahmet Harrison DO    Referring Physician:   Ahmet Harrison*  [phone]    Chief Complaint   Patient presents with    Abdominal Pain     Upper and lower        HPI:    The patient went to her gynecologist and had a EJAN. She said she was ok for a while until her symptoms started again. She has tried keeping a food diary without any patterns seen. She said sometime she has nausea and vomiting the pain is in her epigastrium, sometimes lower abdomen. She has no pain between attacks. She doesn't notice any association with eating. Her symptoms are worse in the afternoons and evenings. The first week of July she had attacks every day, though she can go as long as a month or two without attacks. She had an EGD many years ago. These attacks last as low as 30 minutes.    Patient's medications, allergies, past medical, surgical, social and family histories were reviewed and updated as appropriate.    Review of Systems  Constitutional: negative  Respiratory: negative  Cardiovascular: negative  Gastrointestinal: as in hpi  Genitourinary:negative  Integument/breast: negative    Physical Exam:  BP (!) 134/95 (Site: Left Upper Arm, Position: Sitting, Cuff Size: Medium Adult)   Pulse 83   Temp 97.8 °F (36.6 °C) (Temporal)   Ht 1.727 m (5' 8\")   Wt 86.6 kg (191 lb)   SpO2 93%   BMI 29.04 kg/m²   General appearance: alert, cooperative and in no acute distress.  Lungs: normal work of breathing  Heart: regular rate  Abdomen:  soft, RUQ tenderness, nondistended  Musculoskeletal: symmetrical without edema.  Skin: normal     Data Reviewed:   RUQ US:  IMPRESSION:  Mild fatty liver.     No sonographic evidence of cholecystitis.       HIDA with ensure:  Normal value is >33% for Ensure protocol.  Note, Ensure normal range is based  on a limited study.     IMPRESSION:  1.  Gallbladder ejection

## 2024-07-23 ENCOUNTER — TELEPHONE (OUTPATIENT)
Dept: SURGERY | Age: 54
End: 2024-07-23

## 2024-07-23 DIAGNOSIS — R10.11 RUQ PAIN: ICD-10-CM

## 2024-07-23 NOTE — TELEPHONE ENCOUNTER
Prior Authorization Form:      DEMOGRAPHICS:                     Patient Name:  Tk Mares  Patient :  1970            Insurance:  Payor: [employer] / Plan: [employer] [address] / Product Type: *No Product type* /   Insurance ID Number:    Payer/Plan Subscr  Sex Relation Sub. Ins. ID Effective Group Num   1. [employer]* TK MARES* 1970 Female Self [id number]                                   [address]   2. [facility]* MARY MARES* 10/[birth year]/1969 Male Spouse [id number] 21 [id number]                                   [address]         DIAGNOSIS & PROCEDURE:                       Procedure/Operation: egd           CPT Code: 53212    Diagnosis:  ruq pain    ICD10 Code: R10.11    Location:  [place]    Surgeon:  Lisandro Toscano    SCHEDULING INFORMATION:                          Date: 24  Time: 1400             Anesthesia:  MAC/TIVA                                                       Status:  Outpatient        Special Comments:         Electronically signed by Aida Noyola LPN on 2024 at 8:01 AM

## 2024-08-05 DIAGNOSIS — E78.00 PURE HYPERCHOLESTEROLEMIA: ICD-10-CM

## 2024-08-06 RX ORDER — ROSUVASTATIN CALCIUM 5 MG/1
5 TABLET, COATED ORAL DAILY
Qty: 90 TABLET | Refills: 1 | Status: SHIPPED | OUTPATIENT
Start: 2024-08-06

## 2024-08-23 NOTE — PROGRESS NOTES
[facility] PRE-ADMISSION TESTING INSTRUCTIONS    The Preadmission Testing patient is instructed accordingly using the following criteria (check applicable):    ARRIVAL INSTRUCTIONS:  [x] Parking the day of Surgery is located in the Main Entrance lot.  Upon entering the door, make an immediate right to the surgery reception desk    [x] Bring photo ID and insurance card    [x] Bring in a copy of Living will or Durable Power of  papers.    [x] Please be sure to arrange for responsible adult to provide transportation to and from the hospital    [x] Please arrange for responsible adult to be with you for the 24 hour period post procedure due to having anesthesia    [x] If you awake am of surgery not feeling well or have temperature >100 please call [phone]    GENERAL INSTRUCTIONS:    [x] May have clear liquids until 4 hours prior to surgery. Examples include water, fruit juices (no pulp), jello, popsicles, black coffee or tea,  8 ounces. No milk products               No gum, candy or mints.    [x] You may brush your teeth, but do not swallow any water    [x] Take medications as instructed with 1-2 oz of water    [x] Stop herbal supplements and vitamins 5 days prior to procedure    [] Follow preop dosing of blood thinners per physician instructions    [] Take 1/2 dose of evening insulin, but no insulin after midnight    [] No oral diabetic medications after midnight    [] If diabetic and have low blood sugar or feel symptomatic, take 1-2oz apple juice only    [] Bring inhalers day of surgery    [] Bring C-PAP/ Bi-Pap day of surgery    [] Bring urine specimen day of surgery    [x] Shower or bath with soap, lather and rinse well, AM of Surgery, no lotion, powders or creams to surgical site    [] Follow bowel prep as instructed per surgeon    [x] No tobacco products within 24 hours of surgery     [x] No alcohol or illegal drug use within 24 hours of surgery.    [x] Jewelry, body  piercing's, eyeglasses, contact lenses and dentures are not permitted into surgery (bring cases)      [x] Please do not wear any nail polish, make up or hair products on the day of surgery    [x] You can expect a call the business day prior to procedure to notify you if your arrival time changes    [x] If you receive a survey after surgery we would greatly appreciate your comments    [] Parent/guardian of a minor must accompany their child and remain on the premises  the entire time they are under our care     [] Pediatric patients may bring favorite toy, blanket or comfort item with them    [] A caregiver or family member must remain with the patient during their stay if they are mentally handicapped, have dementia, disoriented or unable to use a call light or would be a safety concern if left unattended    [x] Please notify surgeon if you develop any illness between now and time of surgery (cold, cough, sore throat, fever, nausea, vomiting) or any signs of infections  including skin, wounds, and dental.    [x]  The Outpatient Pharmacy is available to fill your prescription here on your day of surgery, ask your preop nurse for details    [x] Other instructions wear comfortable clothes    EDUCATIONAL MATERIALS PROVIDED:    [] PAT Preoperative Education Packet/Booklet     [] Medication List    [] Transfusion bracelet given to pt. Pt instructed to place on wrist or bring to hospital day of surgery. Informed that if bracelet lost or forgotten at home, bloodwork will have to repeated which could cause a delay in surgery.    [] Shower with soap, lather and rinse well, and use CHG wipes provided the evening before surgery as instructed    [] Incentive spirometer with instructions

## 2024-08-28 ENCOUNTER — ANESTHESIA EVENT (OUTPATIENT)
Dept: ENDOSCOPY | Age: 54
End: 2024-08-28
Payer: COMMERCIAL

## 2024-08-28 ASSESSMENT — LIFESTYLE VARIABLES: SMOKING_STATUS: 0

## 2024-08-28 NOTE — ANESTHESIA PRE PROCEDURE
Department of Anesthesiology  Preprocedure Note       Name:  Tk aCmpbell   Age:  53 y.o.  :  1970                                          MRN:  [id number]         Date:  2024      Surgeon: Surgeon(s):  Cristela Cook MD    Procedure: Procedure(s):  ESOPHAGOGASTRODUODENOSCOPY    Medications prior to admission:   Prior to Admission medications    Medication Sig Start Date End Date Taking? Authorizing Provider   rosuvastatin (CRESTOR) 5 MG tablet TAKE 1 TABLET DAILY 24   Ahmet Harrison, DO   omeprazole (PRILOSEC) 20 MG delayed release capsule Take 1 capsule by mouth every morning (before breakfast) 6/17/24 9/15/24  Cristela Cook MD   montelukast (SINGULAIR) 10 MG tablet TAKE 1 TABLET NIGHTLY 24   Ahmet Harrison, DO   buPROPion (WELLBUTRIN XL) 300 MG extended release tablet TAKE 1 TABLET EVERY MORNING 24   Ahmet Harrison, DO   citalopram (CELEXA) 20 MG tablet TAKE 1 TABLET DAILY 24   Ahmet Harrison, DO   ondansetron (ZOFRAN) 4 MG tablet Take 1 tablet by mouth daily as needed for Nausea or Vomiting 23   Rachel Mendoza,    diphenhydrAMINE HCl (BENADRYL ALLERGY PO)  21   Rodríguez Peñaloza MD   diphenhydrAMINE-APAP, sleep, (TYLENOL PM EXTRA STRENGTH PO)  21   Rodríguez Peñaloza MD   Cetirizine HCl (ZYRTEC PO) Take by mouth    Rodríguez Peñaloza MD   Probiotic Product (PROBIOTIC-10) CAPS Take by mouth    Rodríguez Peñaloza MD   fluticasone (FLONASE) 50 MCG/ACT nasal spray 1 spray by Nasal route daily    Rodríguez Peñaloza MD       Current medications:    No current facility-administered medications for this encounter.     Current Outpatient Medications   Medication Sig Dispense Refill    rosuvastatin (CRESTOR) 5 MG tablet TAKE 1 TABLET DAILY 90 tablet 1    omeprazole (PRILOSEC) 20 MG delayed release capsule Take 1 capsule by mouth every morning (before breakfast) 90 capsule 3    montelukast  auscultation      (-) sleep apnea and not a current smoker                          ROS comment: Seasonal allergies   Cardiovascular:  Exercise tolerance: good (>4 METS)  (+) hyperlipidemia        Rhythm: regular  Rate: normal           Beta Blocker:  Not on Beta Blocker         Neuro/Psych:   (+) psychiatric history:depression/anxiety  (Dysthymia)            GI/Hepatic/Renal:   (+) GERD: no interval change          Endo/Other: Negative Endo/Other ROS             Pt had no PAT visit       Abdominal:         (-) obese       Vascular: negative vascular ROS.         Other Findings:             Anesthesia Plan      MAC     ASA 2     (RUQ pain)  Induction: intravenous.      Anesthetic plan and risks discussed with patient.      Plan discussed with CRNA.              Mo Linares DO   8/28/2024    History, data, and pertinent studies from chart review.  Above represents information available via the shared medical record including previous anesthetic, medication, and allergy history.  Confirmation of above and final disposition per DOS anesthesiologist.    Mo Linares DO  Staff Anesthesiologist  August 28, 2024  12:43 PM     DOS STAFF ADDENDUM:    Patient seen and chart reviewed on DOS.  No interval change in history or exam.  I agree with the anesthesia pre-operative assessment written above and have made the appropriate addendums and/or changes.  Anesthesia plan discussed and risks/benefits addressed.  Patient's concerns and questions answered.  NPO >8 hours.     Mo Linares DO  Staff Anesthesiologist  August 29, 2024  1:25 PM       Reviewed and agree to above note and plan of care    ARAMIS Vernon - CRNA

## 2024-08-29 ENCOUNTER — PREP FOR PROCEDURE (OUTPATIENT)
Dept: SURGERY | Age: 54
End: 2024-08-29

## 2024-08-29 ENCOUNTER — ANESTHESIA (OUTPATIENT)
Dept: ENDOSCOPY | Age: 54
End: 2024-08-29
Payer: COMMERCIAL

## 2024-08-29 ENCOUNTER — HOSPITAL ENCOUNTER (OUTPATIENT)
Age: 54
Setting detail: OUTPATIENT SURGERY
Discharge: HOME | End: 2024-08-29
Attending: SURGERY | Admitting: SURGERY
Payer: COMMERCIAL

## 2024-08-29 VITALS
BODY MASS INDEX: 28.79 KG/M2 | SYSTOLIC BLOOD PRESSURE: 113 MMHG | OXYGEN SATURATION: 95 % | HEIGHT: 68 IN | HEART RATE: 66 BPM | TEMPERATURE: 97 F | WEIGHT: 190 LBS | DIASTOLIC BLOOD PRESSURE: 71 MMHG | RESPIRATION RATE: 18 BRPM

## 2024-08-29 DIAGNOSIS — R10.11 RUQ PAIN: ICD-10-CM

## 2024-08-29 PROCEDURE — 2709999900 HC NON-CHARGEABLE SUPPLY: Performed by: SURGERY

## 2024-08-29 PROCEDURE — 88305 TISSUE EXAM BY PATHOLOGIST: CPT

## 2024-08-29 PROCEDURE — 88342 IMHCHEM/IMCYTCHM 1ST ANTB: CPT

## 2024-08-29 PROCEDURE — 6360000002 HC RX W HCPCS

## 2024-08-29 PROCEDURE — 43239 EGD BIOPSY SINGLE/MULTIPLE: CPT | Performed by: SURGERY

## 2024-08-29 PROCEDURE — 3609012400 HC EGD TRANSORAL BIOPSY SINGLE/MULTIPLE: Performed by: SURGERY

## 2024-08-29 PROCEDURE — 7100000010 HC PHASE II RECOVERY - FIRST 15 MIN: Performed by: SURGERY

## 2024-08-29 PROCEDURE — 3700000000 HC ANESTHESIA ATTENDED CARE: Performed by: SURGERY

## 2024-08-29 PROCEDURE — 2580000003 HC RX 258

## 2024-08-29 PROCEDURE — 7100000011 HC PHASE II RECOVERY - ADDTL 15 MIN: Performed by: SURGERY

## 2024-08-29 RX ORDER — SODIUM CHLORIDE 0.9 % (FLUSH) 0.9 %
5-40 SYRINGE (ML) INJECTION PRN
OUTPATIENT
Start: 2024-08-29

## 2024-08-29 RX ORDER — PROPOFOL 10 MG/ML
INJECTION, EMULSION INTRAVENOUS PRN
Status: DISCONTINUED | OUTPATIENT
Start: 2024-08-29 | End: 2024-08-29 | Stop reason: SDUPTHER

## 2024-08-29 RX ORDER — SODIUM CHLORIDE 9 MG/ML
INJECTION, SOLUTION INTRAVENOUS PRN
OUTPATIENT
Start: 2024-08-29

## 2024-08-29 RX ORDER — INDOCYANINE GREEN AND WATER 25 MG
2.5 KIT INJECTION
OUTPATIENT
Start: 2024-08-29 | End: 2024-08-29

## 2024-08-29 RX ORDER — SODIUM CHLORIDE 0.9 % (FLUSH) 0.9 %
5-40 SYRINGE (ML) INJECTION EVERY 12 HOURS SCHEDULED
OUTPATIENT
Start: 2024-08-29

## 2024-08-29 RX ORDER — SODIUM CHLORIDE 9 MG/ML
INJECTION, SOLUTION INTRAVENOUS CONTINUOUS PRN
Status: DISCONTINUED | OUTPATIENT
Start: 2024-08-29 | End: 2024-08-29 | Stop reason: SDUPTHER

## 2024-08-29 RX ADMIN — SODIUM CHLORIDE: 9 INJECTION, SOLUTION INTRAVENOUS at 13:51

## 2024-08-29 RX ADMIN — PROPOFOL 200 MG: 10 INJECTION, EMULSION INTRAVENOUS at 13:51

## 2024-08-29 ASSESSMENT — PAIN - FUNCTIONAL ASSESSMENT: PAIN_FUNCTIONAL_ASSESSMENT: 0-10

## 2024-08-29 ASSESSMENT — PAIN DESCRIPTION - DESCRIPTORS: DESCRIPTORS: DISCOMFORT

## 2024-08-29 NOTE — ANESTHESIA POSTPROCEDURE EVALUATION
Department of Anesthesiology  Postprocedure Note    Patient: Tk Campbell  MRN: [id number]  YOB: 1970  Date of evaluation: 8/29/2024    Procedure Summary       Date: 08/29/24 Room / Location: [address]    Anesthesia Start: 1350 Anesthesia Stop: 1358    Procedure: ESOPHAGOGASTRODUODENOSCOPY BIOPSY Diagnosis:       RUQ pain      (RUQ pain [R10.11])    Surgeons: Cristela Cook MD Responsible Provider: Mo Linares DO    Anesthesia Type: MAC ASA Status: 2            Anesthesia Type: No value filed.    Elizabeth Phase I: Elizabeth Score: 10    Elizabeth Phase II:      Anesthesia Post Evaluation    Patient location during evaluation: bedside  Patient participation: complete - patient participated  Level of consciousness: awake and alert  Pain score: 0  Airway patency: patent  Nausea & Vomiting: no nausea and no vomiting  Cardiovascular status: blood pressure returned to baseline and hemodynamically stable  Respiratory status: acceptable, spontaneous ventilation and room air  Hydration status: stable  Pain management: adequate        No notable events documented.

## 2024-08-29 NOTE — DISCHARGE INSTRUCTIONS
treatment plan.     Call Your Doctor   After the test, call your doctor if any of the following occurs:   Signs of infection, including fever and chills   Severe abdominal pain   Hard, swollen abdomen   Difficulty swallowing or breathing   Any change or increase in your original symptoms   Bloody or black tarry colored stools   Nausea and/or vomiting   Cough, shortness of breath, or chest pain   Bleeding     In case of emergency, call 911.        FOLLOW-UP CARE:  [x]Call the office at [phone] for follow-up appointment in 1 week

## 2024-08-29 NOTE — OP NOTE
Operative Note: EGD    Tk Campbell     DATE OF PROCEDURE: 8/29/2024  SURGEON: Dr. HUSSEIN LARSON MD, M.D.     PREOPERATIVE DIAGNOSES:   RUQ pain    POSTOPERATIVE DIAGNOSES:  Small hiatal hernia  GERD      OPERATION:    EGD esophagogastroduodenoscopy with antral, duodenal, distal esophageal biopsies    SPECIMENS:  * No specimens in log *    ANESTHESIA: LMAC    BLOOD LOSS: Minimal    CONSENT AND INDICATIONS:  This is a 53 y.o. year old female who is having the above. I have discussed with the patient and/or the patient representative the indication, alternatives, and the possible risks and/or complications of the planned procedure and the anesthesia methods. The patient and/or patient representative appear to understand and agree to proceed.      PROCEDURE: The patient was placed on the table and sedated by anesthesia. Bite block was placed. A lubricated scope was easily passed into the upper esophagus which looked normal. The distal esophagus looked abnormal: GERD and biopsies were taken. The gastroesophageal junction was at 37 cm. The scope was passed into the stomach and retroflexed. There was a 2 cm sliding hiatal hernia 37 cm - 39 cm. The scope was passed down toward the pylorus. The antral mucosa all looked normal. Biopsy was taken. The scope was then passed through the pylorus into the duodenal bulb which looked normal, then around to the distal duodenum which looked normal and biopsies were taken, and the scope was then withdrawn. The patient tolerated the procedure well.    PLAN: Follow up biopsies.    As long as biopsies are normal, will set up for surgery:  Robotic assisted Laparoscopic possible open cholecystectomy possible intraoperative cholangiogram  Risks of cholecystectomy were discussed with the patient. These include but are not limited to common bile duct injury, bile leak, liver injury, damage to blood vessels and bleeding, injury to bowel with port placement, as well as infection

## 2024-08-29 NOTE — H&P
Patient's office history and physical was reviewed.    Patient examined.    There has been no change in the patient's history and physical.      Physician Signature: Electronically signed by Dr. Cristela Baeza     Patient's Name/Date of Birth: Tk Campbell / 1970     Date: 8/29/24     PCP: Ahmet Harrison DO     Referring Physician:   Ahmet Harrison*  [phone]          Chief Complaint   Patient presents with    Abdominal Pain       Upper and lower          HPI:     The patient went to her gynecologist and had a JEAN. She said she was ok for a while until her symptoms started again. She has tried keeping a food diary without any patterns seen. She said sometime she has nausea and vomiting the pain is in her epigastrium, sometimes lower abdomen. She has no pain between attacks. She doesn't notice any association with eating. Her symptoms are worse in the afternoons and evenings. The first week of July she had attacks every day, though she can go as long as a month or two without attacks. She had an EGD many years ago. These attacks last as low as 30 minutes.     Patient's medications, allergies, past medical, surgical, social and family histories were reviewed and updated as appropriate.     Review of Systems  Constitutional: negative  Respiratory: negative  Cardiovascular: negative  Gastrointestinal: as in hpi  Genitourinary:negative  Integument/breast: negative     Physical Exam:  BP (!) 134/95 (Site: Left Upper Arm, Position: Sitting, Cuff Size: Medium Adult)   Pulse 83   Temp 97.8 °F (36.6 °C) (Temporal)   Ht 1.727 m (5' 8\")   Wt 86.6 kg (191 lb)   SpO2 93%   BMI 29.04 kg/m²   General appearance: alert, cooperative and in no acute distress.  Lungs: normal work of breathing  Heart: regular rate  Abdomen:  soft, RUQ tenderness, nondistended  Musculoskeletal: symmetrical without edema.  Skin: normal      Data Reviewed:   RUQ US:  IMPRESSION:  Mild fatty liver.      No sonographic evidence of cholecystitis.        HIDA with ensure:  Normal value is >33% for Ensure protocol.  Note, Ensure normal range is based  on a limited study.     IMPRESSION:  1.  Gallbladder ejection fraction is 79%.     2.  No acute cholecystitis.     Impression/Plan:  53 y.o. year old female with abdominal pain     All available labs and pathology reviewed.  All imaging independently reviewed and interpreted.   All provider notes reviewed.  The above was discussed with the patient at length.     I will set the patient up for an Esophagogastroduodenoscopy, possible biopsy, possible polypectomy.     I explained the risks including but not limited to bowel perforation, postoperative bleeding, post-polypectomy syndrome, as well as the possibility of needing emergency surgery or another colonoscopy. The benefits, alternatives, and potential complications associated with the above procedure to be performed and transfusions when applicable with the patient/responsible person prior to the procedure. All of the patient's questions were answered. The patient understands and agrees to the procedure.      May need cholecystectomy if symptoms continue and EGD is normal. Symptoms consistent with biliary colic and has tenderness in RUQ on exam.     Electronically by Cristela Cook MD, General Surgery

## 2024-08-30 ENCOUNTER — TELEPHONE (OUTPATIENT)
Dept: SURGERY | Age: 54
End: 2024-08-30

## 2024-08-30 DIAGNOSIS — K80.50 BILIARY COLIC: ICD-10-CM

## 2024-08-30 NOTE — TELEPHONE ENCOUNTER
Prior Authorization Form:      DEMOGRAPHICS:                     Patient Name:  Tk Mares  Patient :  1970            Insurance:  Payor: [employer] / Plan: [employer] [address] / Product Type: *No Product type* /   Insurance ID Number:    Payer/Plan Subscr  Sex Relation Sub. Ins. ID Effective Group Num   1. [employer]* TK MARES* 1970 Female Self [id number]                                   [address]   2. [facility]* MARY MARES* 10/[birth year]/1969 Male Spouse [id number] 21 [id number]                                   [address]         DIAGNOSIS & PROCEDURE:                       Procedure/Operation: laparoscopic robotic cholecystectomy           CPT Code: 78980    Diagnosis:  biliary colic    ICD10 Code: K80.5    Location:  [facility]    Surgeon:  MARSHA    SCHEDULING INFORMATION:                          Date: 2024    Time: 8:00 AM              Anesthesia: GENERAL                                                       Status:  Outpatient        Special Comments:         Electronically signed by Ngozi Rubio MA on 2024 at 2:54 PM

## 2024-09-05 ENCOUNTER — OFFICE VISIT (OUTPATIENT)
Dept: PRIMARY CARE CLINIC | Age: 54
End: 2024-09-05
Payer: COMMERCIAL

## 2024-09-05 VITALS
TEMPERATURE: 97.4 F | BODY MASS INDEX: 29.1 KG/M2 | WEIGHT: 192 LBS | HEART RATE: 83 BPM | OXYGEN SATURATION: 97 % | HEIGHT: 68 IN | DIASTOLIC BLOOD PRESSURE: 68 MMHG | SYSTOLIC BLOOD PRESSURE: 124 MMHG

## 2024-09-05 DIAGNOSIS — M94.0 COSTOCHONDRITIS: Primary | ICD-10-CM

## 2024-09-05 PROCEDURE — 1036F TOBACCO NON-USER: CPT | Performed by: FAMILY MEDICINE

## 2024-09-05 PROCEDURE — G8419 CALC BMI OUT NRM PARAM NOF/U: HCPCS | Performed by: FAMILY MEDICINE

## 2024-09-05 PROCEDURE — G8427 DOCREV CUR MEDS BY ELIG CLIN: HCPCS | Performed by: FAMILY MEDICINE

## 2024-09-05 PROCEDURE — 3017F COLORECTAL CA SCREEN DOC REV: CPT | Performed by: FAMILY MEDICINE

## 2024-09-05 PROCEDURE — 99213 OFFICE O/P EST LOW 20 MIN: CPT | Performed by: FAMILY MEDICINE

## 2024-09-05 RX ORDER — NAPROXEN 500 MG/1
500 TABLET ORAL 2 TIMES DAILY WITH MEALS
Qty: 30 TABLET | Refills: 1 | Status: SHIPPED | OUTPATIENT
Start: 2024-09-05

## 2024-09-05 ASSESSMENT — PATIENT HEALTH QUESTIONNAIRE - PHQ9
SUM OF ALL RESPONSES TO PHQ QUESTIONS 1-9: 0
SUM OF ALL RESPONSES TO PHQ QUESTIONS 1-9: 0
1. LITTLE INTEREST OR PLEASURE IN DOING THINGS: NOT AT ALL
9. THOUGHTS THAT YOU WOULD BE BETTER OFF DEAD, OR OF HURTING YOURSELF: NOT AT ALL
10. IF YOU CHECKED OFF ANY PROBLEMS, HOW DIFFICULT HAVE THESE PROBLEMS MADE IT FOR YOU TO DO YOUR WORK, TAKE CARE OF THINGS AT HOME, OR GET ALONG WITH OTHER PEOPLE: NOT DIFFICULT AT ALL
7. TROUBLE CONCENTRATING ON THINGS, SUCH AS READING THE NEWSPAPER OR WATCHING TELEVISION: NOT AT ALL
8. MOVING OR SPEAKING SO SLOWLY THAT OTHER PEOPLE COULD HAVE NOTICED. OR THE OPPOSITE, BEING SO FIGETY OR RESTLESS THAT YOU HAVE BEEN MOVING AROUND A LOT MORE THAN USUAL: NOT AT ALL
6. FEELING BAD ABOUT YOURSELF - OR THAT YOU ARE A FAILURE OR HAVE LET YOURSELF OR YOUR FAMILY DOWN: NOT AT ALL
3. TROUBLE FALLING OR STAYING ASLEEP: NOT AT ALL
SUM OF ALL RESPONSES TO PHQ QUESTIONS 1-9: 0
4. FEELING TIRED OR HAVING LITTLE ENERGY: NOT AT ALL
SUM OF ALL RESPONSES TO PHQ9 QUESTIONS 1 & 2: 0
5. POOR APPETITE OR OVEREATING: NOT AT ALL
2. FEELING DOWN, DEPRESSED OR HOPELESS: NOT AT ALL
SUM OF ALL RESPONSES TO PHQ QUESTIONS 1-9: 0

## 2024-09-05 ASSESSMENT — ENCOUNTER SYMPTOMS
NAUSEA: 0
COUGH: 0
BACK PAIN: 1
ABDOMINAL PAIN: 1
VOMITING: 0
SHORTNESS OF BREATH: 0

## 2024-09-05 NOTE — PROGRESS NOTES
Tk Campbell, a female of 53 y.o. came to the office 9/5/2024.     Patient Active Problem List   Diagnosis    Chronic non-seasonal allergic rhinitis    Dysthymia    Pure hypercholesterolemia    RUQ pain    Biliary colic          Back Pain  This is a new problem. The current episode started in the past 7 days (8/28). Pain location: R lower ribs. The quality of the pain is described as aching. Radiates to: into ribs in back. Associated symptoms include abdominal pain. Pertinent negatives include no chest pain.    GI: needing wendy scheduled for Dec.      No Known Allergies    Current Outpatient Medications on File Prior to Visit   Medication Sig Dispense Refill    rosuvastatin (CRESTOR) 5 MG tablet TAKE 1 TABLET DAILY 90 tablet 1    omeprazole (PRILOSEC) 20 MG delayed release capsule Take 1 capsule by mouth every morning (before breakfast) 90 capsule 3    montelukast (SINGULAIR) 10 MG tablet TAKE 1 TABLET NIGHTLY 90 tablet 2    buPROPion (WELLBUTRIN XL) 300 MG extended release tablet TAKE 1 TABLET EVERY MORNING 90 tablet 2    citalopram (CELEXA) 20 MG tablet TAKE 1 TABLET DAILY 90 tablet 3    ondansetron (ZOFRAN) 4 MG tablet Take 1 tablet by mouth daily as needed for Nausea or Vomiting 30 tablet 0    diphenhydrAMINE HCl (BENADRYL ALLERGY PO)       diphenhydrAMINE-APAP, sleep, (TYLENOL PM EXTRA STRENGTH PO)       Cetirizine HCl (ZYRTEC PO) Take by mouth      Probiotic Product (PROBIOTIC-10) CAPS Take by mouth      fluticasone (FLONASE) 50 MCG/ACT nasal spray 1 spray by Nasal route daily       No current facility-administered medications on file prior to visit.       Review of Systems   Respiratory:  Negative for cough and shortness of breath.    Cardiovascular:  Negative for chest pain.   Gastrointestinal:  Positive for abdominal pain. Negative for nausea and vomiting.        Diagnosed with HH 2 cm and GERD on EGD 8/29/24.   Musculoskeletal:  Positive for back pain.     other review of systems reviewed and are

## 2024-09-09 LAB — SURGICAL PATHOLOGY REPORT: NORMAL

## 2024-10-24 ENCOUNTER — PATIENT MESSAGE (OUTPATIENT)
Dept: PRIMARY CARE CLINIC | Age: 54
End: 2024-10-24

## 2024-10-24 DIAGNOSIS — F41.9 ACUTE ANXIETY: Primary | ICD-10-CM

## 2024-10-25 RX ORDER — ALPRAZOLAM 0.5 MG
0.5 TABLET ORAL DAILY PRN
Qty: 15 TABLET | Refills: 0 | Status: SHIPPED | OUTPATIENT
Start: 2024-10-25 | End: 2024-11-24

## 2024-11-12 RX ORDER — MONTELUKAST SODIUM 10 MG/1
10 TABLET ORAL
Qty: 90 TABLET | Refills: 1 | Status: SHIPPED | OUTPATIENT
Start: 2024-11-12

## 2024-11-25 ASSESSMENT — PATIENT HEALTH QUESTIONNAIRE - PHQ9
8. MOVING OR SPEAKING SO SLOWLY THAT OTHER PEOPLE COULD HAVE NOTICED. OR THE OPPOSITE - BEING SO FIDGETY OR RESTLESS THAT YOU HAVE BEEN MOVING AROUND A LOT MORE THAN USUAL: NOT AT ALL
5. POOR APPETITE OR OVEREATING: NOT AT ALL
6. FEELING BAD ABOUT YOURSELF - OR THAT YOU ARE A FAILURE OR HAVE LET YOURSELF OR YOUR FAMILY DOWN: NOT AT ALL
3. TROUBLE FALLING OR STAYING ASLEEP: SEVERAL DAYS
SUM OF ALL RESPONSES TO PHQ QUESTIONS 1-9: 3
2. FEELING DOWN, DEPRESSED OR HOPELESS: NOT AT ALL
7. TROUBLE CONCENTRATING ON THINGS, SUCH AS READING THE NEWSPAPER OR WATCHING TELEVISION: SEVERAL DAYS
SUM OF ALL RESPONSES TO PHQ9 QUESTIONS 1 & 2: 0
9. THOUGHTS THAT YOU WOULD BE BETTER OFF DEAD, OR OF HURTING YOURSELF: NOT AT ALL
6. FEELING BAD ABOUT YOURSELF - OR THAT YOU ARE A FAILURE OR HAVE LET YOURSELF OR YOUR FAMILY DOWN: NOT AT ALL
9. THOUGHTS THAT YOU WOULD BE BETTER OFF DEAD, OR OF HURTING YOURSELF: NOT AT ALL
7. TROUBLE CONCENTRATING ON THINGS, SUCH AS READING THE NEWSPAPER OR WATCHING TELEVISION: SEVERAL DAYS
4. FEELING TIRED OR HAVING LITTLE ENERGY: SEVERAL DAYS
3. TROUBLE FALLING OR STAYING ASLEEP: SEVERAL DAYS
2. FEELING DOWN, DEPRESSED OR HOPELESS: NOT AT ALL
8. MOVING OR SPEAKING SO SLOWLY THAT OTHER PEOPLE COULD HAVE NOTICED. OR THE OPPOSITE, BEING SO FIGETY OR RESTLESS THAT YOU HAVE BEEN MOVING AROUND A LOT MORE THAN USUAL: NOT AT ALL
SUM OF ALL RESPONSES TO PHQ QUESTIONS 1-9: 3
SUM OF ALL RESPONSES TO PHQ QUESTIONS 1-9: 3
1. LITTLE INTEREST OR PLEASURE IN DOING THINGS: NOT AT ALL
5. POOR APPETITE OR OVEREATING: NOT AT ALL
4. FEELING TIRED OR HAVING LITTLE ENERGY: SEVERAL DAYS
10. IF YOU CHECKED OFF ANY PROBLEMS, HOW DIFFICULT HAVE THESE PROBLEMS MADE IT FOR YOU TO DO YOUR WORK, TAKE CARE OF THINGS AT HOME, OR GET ALONG WITH OTHER PEOPLE: SOMEWHAT DIFFICULT
10. IF YOU CHECKED OFF ANY PROBLEMS, HOW DIFFICULT HAVE THESE PROBLEMS MADE IT FOR YOU TO DO YOUR WORK, TAKE CARE OF THINGS AT HOME, OR GET ALONG WITH OTHER PEOPLE: SOMEWHAT DIFFICULT
1. LITTLE INTEREST OR PLEASURE IN DOING THINGS: NOT AT ALL
SUM OF ALL RESPONSES TO PHQ QUESTIONS 1-9: 3
SUM OF ALL RESPONSES TO PHQ QUESTIONS 1-9: 3

## 2024-11-27 NOTE — PROGRESS NOTES
[facility] PRE-ADMISSION TESTING INSTRUCTIONS    The Preadmission Testing patient is instructed accordingly using the following criteria (check applicable):    ARRIVAL INSTRUCTIONS:  [x] Parking the day of Surgery is located in the Main Entrance lot.  Upon entering the door, make an immediate right to the surgery reception desk    [x] Bring photo ID and insurance card    [] Bring in a copy of Living will or Durable Power of  papers.    [x] Please be sure to arrange for responsible adult to provide transportation to and from the hospital    [x] Please arrange for responsible adult to be with you for the 24 hour period post procedure due to having anesthesia    [x] If you awake am of surgery not feeling well or have temperature >100 please call [phone]    GENERAL INSTRUCTIONS:    [x] May have clear liquids until 4 hours prior to surgery. Examples include water, fruit juices (no pulp), jello, popsicles, black coffee or tea, beef or chicken broth.               No gum, candy or mints.    [x] You may brush your teeth, but do not swallow any water    [x] Take medications as instructed with 1-2 oz of water    [] Stop herbal supplements and vitamins 5 days prior to procedure    [] Follow preop dosing of blood thinners per physician instructions    [] Take 1/2 dose of evening insulin, but no insulin after midnight    [] No oral diabetic medications after midnight    [] If diabetic and have low blood sugar or feel symptomatic, take 1-2oz apple juice only    [] Bring inhalers day of surgery    [] Bring C-PAP/ Bi-Pap day of surgery    [] Bring urine specimen day of surgery    [x] Shower or bath with soap, lather and rinse well, AM of Surgery, no lotion, powders or creams to surgical site    [] Follow bowel prep as instructed per surgeon    [x] No tobacco products within 24 hours of surgery     [x] No alcohol or illegal drug use within 24 hours of surgery.    [x] Jewelry, body piercing's,

## 2024-12-02 ENCOUNTER — ANESTHESIA EVENT (OUTPATIENT)
Dept: OPERATING ROOM | Age: 54
End: 2024-12-02
Payer: COMMERCIAL

## 2024-12-02 ENCOUNTER — OFFICE VISIT (OUTPATIENT)
Dept: PRIMARY CARE CLINIC | Age: 54
End: 2024-12-02
Payer: COMMERCIAL

## 2024-12-02 VITALS
DIASTOLIC BLOOD PRESSURE: 74 MMHG | OXYGEN SATURATION: 97 % | BODY MASS INDEX: 29.55 KG/M2 | WEIGHT: 195 LBS | HEIGHT: 68 IN | TEMPERATURE: 97.5 F | HEART RATE: 67 BPM | RESPIRATION RATE: 18 BRPM | SYSTOLIC BLOOD PRESSURE: 120 MMHG

## 2024-12-02 DIAGNOSIS — H61.22 IMPACTED CERUMEN OF LEFT EAR: ICD-10-CM

## 2024-12-02 DIAGNOSIS — Z00.00 ANNUAL PHYSICAL EXAM: Primary | ICD-10-CM

## 2024-12-02 LAB
ALBUMIN: 4.7 G/DL (ref 3.5–5.2)
ALP BLD-CCNC: 96 U/L (ref 35–104)
ALT SERPL-CCNC: 19 U/L (ref 0–32)
ANION GAP SERPL CALCULATED.3IONS-SCNC: 10 MMOL/L (ref 7–16)
AST SERPL-CCNC: 21 U/L (ref 0–31)
BILIRUB SERPL-MCNC: 0.4 MG/DL (ref 0–1.2)
BUN BLDV-MCNC: 13 MG/DL (ref 6–20)
CALCIUM SERPL-MCNC: 9.8 MG/DL (ref 8.6–10.2)
CHLORIDE BLD-SCNC: 102 MMOL/L (ref 98–107)
CHOLESTEROL, TOTAL: 185 MG/DL
CO2: 29 MMOL/L (ref 22–29)
CREAT SERPL-MCNC: 1 MG/DL (ref 0.5–1)
GFR, ESTIMATED: 65 ML/MIN/1.73M2
GLUCOSE BLD-MCNC: 107 MG/DL (ref 74–99)
HCT VFR BLD CALC: 43.8 % (ref 34–48)
HDLC SERPL-MCNC: 56 MG/DL
HEMOGLOBIN: 13.4 G/DL (ref 11.5–15.5)
LDL CHOLESTEROL: 108 MG/DL
MCH RBC QN AUTO: 27 PG (ref 26–35)
MCHC RBC AUTO-ENTMCNC: 30.6 G/DL (ref 32–34.5)
MCV RBC AUTO: 88.1 FL (ref 80–99.9)
PDW BLD-RTO: 13 % (ref 11.5–15)
PLATELET # BLD: 260 K/UL (ref 130–450)
PMV BLD AUTO: 10.3 FL (ref 7–12)
POTASSIUM SERPL-SCNC: 4.6 MMOL/L (ref 3.5–5)
RBC # BLD: 4.97 M/UL (ref 3.5–5.5)
SODIUM BLD-SCNC: 141 MMOL/L (ref 132–146)
TOTAL PROTEIN: 7.5 G/DL (ref 6.4–8.3)
TRIGL SERPL-MCNC: 105 MG/DL
VLDLC SERPL CALC-MCNC: 21 MG/DL
WBC # BLD: 6.1 K/UL (ref 4.5–11.5)

## 2024-12-02 PROCEDURE — 36415 COLL VENOUS BLD VENIPUNCTURE: CPT | Performed by: FAMILY MEDICINE

## 2024-12-02 PROCEDURE — G8484 FLU IMMUNIZE NO ADMIN: HCPCS | Performed by: FAMILY MEDICINE

## 2024-12-02 PROCEDURE — 99396 PREV VISIT EST AGE 40-64: CPT | Performed by: FAMILY MEDICINE

## 2024-12-02 SDOH — ECONOMIC STABILITY: INCOME INSECURITY: HOW HARD IS IT FOR YOU TO PAY FOR THE VERY BASICS LIKE FOOD, HOUSING, MEDICAL CARE, AND HEATING?: NOT HARD AT ALL

## 2024-12-02 SDOH — ECONOMIC STABILITY: FOOD INSECURITY: WITHIN THE PAST 12 MONTHS, YOU WORRIED THAT YOUR FOOD WOULD RUN OUT BEFORE YOU GOT MONEY TO BUY MORE.: NEVER TRUE

## 2024-12-02 SDOH — ECONOMIC STABILITY: FOOD INSECURITY: WITHIN THE PAST 12 MONTHS, THE FOOD YOU BOUGHT JUST DIDN'T LAST AND YOU DIDN'T HAVE MONEY TO GET MORE.: NEVER TRUE

## 2024-12-02 NOTE — PROGRESS NOTES
SUBJECTIVE:    HPI: Tk Campbell  Was seen here today for   Chief Complaint   Patient presents with    Employment Physical    Ear Pain     Left started few months ago intermittent    .  She states they are dealing with left ear sens of cotton ball in left.     Past Medical History:   Diagnosis Date    Allergic rhinitis     COVID-19 virus infection 11/25/2021    Depression     Gallstones     GERD (gastroesophageal reflux disease)     Hiatal hernia 08/29/2024    Hyperlipidemia        Past Surgical History:   Procedure Laterality Date    COLONOSCOPY N/A 01/27/2023    COLORECTAL CANCER SCREENING, NOT HIGH RISK performed by Cristela Cook MD at [facility] ENDOSCOPY    HYSTERECTOMY (CERVIX STATUS UNKNOWN)  06/2024    [facility]    TONSILLECTOMY      UPPER GASTROINTESTINAL ENDOSCOPY N/A 8/29/2024    ESOPHAGOGASTRODUODENOSCOPY BIOPSY performed by Cristela Cook MD at [facility] ENDOSCOPY       Family History   Problem Relation Age of Onset    Breast Cancer Maternal Grandmother     Other Father         MULTIPLE MYELOMA    Migraines Mother     Hypertension Brother     Diabetes Brother        Prior to Admission medications    Medication Sig Start Date End Date Taking? Authorizing Provider   montelukast (SINGULAIR) 10 MG tablet TAKE 1 TABLET NIGHTLY 11/12/24  Yes Ahmet Harrison DO   rosuvastatin (CRESTOR) 5 MG tablet TAKE 1 TABLET DAILY 8/6/24  Yes Ahmet Harrison DO   omeprazole (PRILOSEC) 20 MG delayed release capsule Take 1 capsule by mouth every morning (before breakfast) 6/17/24 12/2/24 Yes Cristela Cook MD   buPROPion (WELLBUTRIN XL) 300 MG extended release tablet TAKE 1 TABLET EVERY MORNING 2/16/24  Yes Ahmet Harrison DO   citalopram (CELEXA) 20 MG tablet TAKE 1 TABLET DAILY 1/31/24  Yes Ahmet Harrison DO   ondansetron (ZOFRAN) 4 MG tablet Take 1 tablet by mouth daily as needed for Nausea or Vomiting 11/19/23  Yes Rachel Mendoza, DO   diphenhydrAMINE HCl

## 2024-12-03 ENCOUNTER — HOSPITAL ENCOUNTER (OUTPATIENT)
Age: 54
Setting detail: OUTPATIENT SURGERY
Discharge: HOME | End: 2024-12-03
Attending: SURGERY | Admitting: SURGERY
Payer: COMMERCIAL

## 2024-12-03 ENCOUNTER — ANESTHESIA (OUTPATIENT)
Dept: OPERATING ROOM | Age: 54
End: 2024-12-03
Payer: COMMERCIAL

## 2024-12-03 VITALS
TEMPERATURE: 97 F | OXYGEN SATURATION: 94 % | WEIGHT: 190 LBS | HEIGHT: 68 IN | BODY MASS INDEX: 28.79 KG/M2 | SYSTOLIC BLOOD PRESSURE: 113 MMHG | HEART RATE: 72 BPM | DIASTOLIC BLOOD PRESSURE: 69 MMHG | RESPIRATION RATE: 17 BRPM

## 2024-12-03 DIAGNOSIS — K80.50 BILIARY COLIC: ICD-10-CM

## 2024-12-03 DIAGNOSIS — G89.18 POSTOPERATIVE PAIN: Primary | ICD-10-CM

## 2024-12-03 LAB
ALBUMIN SERPL-MCNC: 4.3 G/DL (ref 3.5–5.2)
ALP SERPL-CCNC: 101 U/L (ref 35–104)
ALT SERPL-CCNC: 19 U/L (ref 0–32)
AST SERPL-CCNC: 19 U/L (ref 0–31)
BILIRUB DIRECT SERPL-MCNC: <0.2 MG/DL (ref 0–0.3)
BILIRUB INDIRECT SERPL-MCNC: NORMAL MG/DL (ref 0–1)
BILIRUB SERPL-MCNC: 0.3 MG/DL (ref 0–1.2)
PROT SERPL-MCNC: 6.8 G/DL (ref 6.4–8.3)

## 2024-12-03 PROCEDURE — 47562 LAPAROSCOPIC CHOLECYSTECTOMY: CPT | Performed by: SURGERY

## 2024-12-03 PROCEDURE — 7100000011 HC PHASE II RECOVERY - ADDTL 15 MIN: Performed by: SURGERY

## 2024-12-03 PROCEDURE — 2709999900 HC NON-CHARGEABLE SUPPLY: Performed by: SURGERY

## 2024-12-03 PROCEDURE — 2580000003 HC RX 258: Performed by: SURGERY

## 2024-12-03 PROCEDURE — 7100000001 HC PACU RECOVERY - ADDTL 15 MIN: Performed by: SURGERY

## 2024-12-03 PROCEDURE — 7100000000 HC PACU RECOVERY - FIRST 15 MIN: Performed by: SURGERY

## 2024-12-03 PROCEDURE — 2500000003 HC RX 250 WO HCPCS

## 2024-12-03 PROCEDURE — 6370000000 HC RX 637 (ALT 250 FOR IP): Performed by: ANESTHESIOLOGY

## 2024-12-03 PROCEDURE — 3600000009 HC SURGERY ROBOT BASE: Performed by: SURGERY

## 2024-12-03 PROCEDURE — 3700000000 HC ANESTHESIA ATTENDED CARE: Performed by: SURGERY

## 2024-12-03 PROCEDURE — 80076 HEPATIC FUNCTION PANEL: CPT

## 2024-12-03 PROCEDURE — S2900 ROBOTIC SURGICAL SYSTEM: HCPCS | Performed by: SURGERY

## 2024-12-03 PROCEDURE — 7100000010 HC PHASE II RECOVERY - FIRST 15 MIN: Performed by: SURGERY

## 2024-12-03 PROCEDURE — 3600000019 HC SURGERY ROBOT ADDTL 15MIN: Performed by: SURGERY

## 2024-12-03 PROCEDURE — 6360000002 HC RX W HCPCS

## 2024-12-03 PROCEDURE — 3700000001 HC ADD 15 MINUTES (ANESTHESIA): Performed by: SURGERY

## 2024-12-03 PROCEDURE — 88304 TISSUE EXAM BY PATHOLOGIST: CPT

## 2024-12-03 PROCEDURE — 2500000003 HC RX 250 WO HCPCS: Performed by: SURGERY

## 2024-12-03 PROCEDURE — 6360000002 HC RX W HCPCS: Performed by: SURGERY

## 2024-12-03 PROCEDURE — C1889 IMPLANT/INSERT DEVICE, NOC: HCPCS | Performed by: SURGERY

## 2024-12-03 PROCEDURE — 6360000002 HC RX W HCPCS: Performed by: ANESTHESIOLOGY

## 2024-12-03 DEVICE — HEMOLOK ML 6 CLIPS/CART
Type: IMPLANTABLE DEVICE | Status: FUNCTIONAL
Brand: WECK

## 2024-12-03 RX ORDER — DIPHENHYDRAMINE HYDROCHLORIDE 50 MG/ML
12.5 INJECTION INTRAMUSCULAR; INTRAVENOUS
Status: COMPLETED | OUTPATIENT
Start: 2024-12-03 | End: 2024-12-03

## 2024-12-03 RX ORDER — LIDOCAINE HYDROCHLORIDE 20 MG/ML
INJECTION, SOLUTION EPIDURAL; INFILTRATION; INTRACAUDAL; PERINEURAL
Status: DISCONTINUED | OUTPATIENT
Start: 2024-12-03 | End: 2024-12-03 | Stop reason: SDUPTHER

## 2024-12-03 RX ORDER — SODIUM CHLORIDE 0.9 % (FLUSH) 0.9 %
5-40 SYRINGE (ML) INJECTION PRN
Status: DISCONTINUED | OUTPATIENT
Start: 2024-12-03 | End: 2024-12-03 | Stop reason: HOSPADM

## 2024-12-03 RX ORDER — ONDANSETRON 2 MG/ML
INJECTION INTRAMUSCULAR; INTRAVENOUS
Status: DISCONTINUED | OUTPATIENT
Start: 2024-12-03 | End: 2024-12-03 | Stop reason: SDUPTHER

## 2024-12-03 RX ORDER — SODIUM CHLORIDE 9 MG/ML
INJECTION, SOLUTION INTRAVENOUS PRN
Status: DISCONTINUED | OUTPATIENT
Start: 2024-12-03 | End: 2024-12-03 | Stop reason: HOSPADM

## 2024-12-03 RX ORDER — PHENYLEPHRINE HCL IN 0.9% NACL 1 MG/10 ML
SYRINGE (ML) INTRAVENOUS
Status: DISCONTINUED | OUTPATIENT
Start: 2024-12-03 | End: 2024-12-03 | Stop reason: SDUPTHER

## 2024-12-03 RX ORDER — GLYCOPYRROLATE 0.2 MG/ML
INJECTION INTRAMUSCULAR; INTRAVENOUS
Status: DISCONTINUED | OUTPATIENT
Start: 2024-12-03 | End: 2024-12-03 | Stop reason: SDUPTHER

## 2024-12-03 RX ORDER — MEPERIDINE HYDROCHLORIDE 25 MG/ML
12.5 INJECTION INTRAMUSCULAR; INTRAVENOUS; SUBCUTANEOUS EVERY 5 MIN PRN
Status: DISCONTINUED | OUTPATIENT
Start: 2024-12-03 | End: 2024-12-03 | Stop reason: HOSPADM

## 2024-12-03 RX ORDER — SODIUM CHLORIDE 0.9 % (FLUSH) 0.9 %
5-40 SYRINGE (ML) INJECTION EVERY 12 HOURS SCHEDULED
Status: DISCONTINUED | OUTPATIENT
Start: 2024-12-03 | End: 2024-12-03 | Stop reason: HOSPADM

## 2024-12-03 RX ORDER — NALOXONE HYDROCHLORIDE 0.4 MG/ML
INJECTION, SOLUTION INTRAMUSCULAR; INTRAVENOUS; SUBCUTANEOUS PRN
Status: DISCONTINUED | OUTPATIENT
Start: 2024-12-03 | End: 2024-12-03 | Stop reason: HOSPADM

## 2024-12-03 RX ORDER — KETOROLAC TROMETHAMINE 30 MG/ML
INJECTION, SOLUTION INTRAMUSCULAR; INTRAVENOUS
Status: DISCONTINUED | OUTPATIENT
Start: 2024-12-03 | End: 2024-12-03 | Stop reason: SDUPTHER

## 2024-12-03 RX ORDER — ROCURONIUM BROMIDE 10 MG/ML
INJECTION, SOLUTION INTRAVENOUS
Status: DISCONTINUED | OUTPATIENT
Start: 2024-12-03 | End: 2024-12-03 | Stop reason: SDUPTHER

## 2024-12-03 RX ORDER — PROPOFOL 10 MG/ML
INJECTION, EMULSION INTRAVENOUS
Status: DISCONTINUED | OUTPATIENT
Start: 2024-12-03 | End: 2024-12-03 | Stop reason: SDUPTHER

## 2024-12-03 RX ORDER — HYDROCODONE BITARTRATE AND ACETAMINOPHEN 5; 325 MG/1; MG/1
1 TABLET ORAL EVERY 4 HOURS PRN
Qty: 18 TABLET | Refills: 0 | Status: SHIPPED | OUTPATIENT
Start: 2024-12-03 | End: 2024-12-06

## 2024-12-03 RX ORDER — DEXAMETHASONE SODIUM PHOSPHATE 4 MG/ML
INJECTION, SOLUTION INTRA-ARTICULAR; INTRALESIONAL; INTRAMUSCULAR; INTRAVENOUS; SOFT TISSUE
Status: DISCONTINUED | OUTPATIENT
Start: 2024-12-03 | End: 2024-12-03 | Stop reason: SDUPTHER

## 2024-12-03 RX ORDER — FENTANYL CITRATE 50 UG/ML
INJECTION, SOLUTION INTRAMUSCULAR; INTRAVENOUS
Status: DISCONTINUED | OUTPATIENT
Start: 2024-12-03 | End: 2024-12-03 | Stop reason: SDUPTHER

## 2024-12-03 RX ORDER — OXYCODONE HYDROCHLORIDE 5 MG/1
5 TABLET ORAL PRN
Status: COMPLETED | OUTPATIENT
Start: 2024-12-03 | End: 2024-12-03

## 2024-12-03 RX ORDER — INDOCYANINE GREEN AND WATER 25 MG
2.5 KIT INJECTION
Status: COMPLETED | OUTPATIENT
Start: 2024-12-03 | End: 2024-12-03

## 2024-12-03 RX ORDER — OXYCODONE HYDROCHLORIDE 5 MG/1
10 TABLET ORAL PRN
Status: COMPLETED | OUTPATIENT
Start: 2024-12-03 | End: 2024-12-03

## 2024-12-03 RX ORDER — KETAMINE HYDROCHLORIDE 10 MG/ML
INJECTION, SOLUTION INTRAMUSCULAR; INTRAVENOUS
Status: DISCONTINUED | OUTPATIENT
Start: 2024-12-03 | End: 2024-12-03 | Stop reason: SDUPTHER

## 2024-12-03 RX ADMIN — KETAMINE HYDROCHLORIDE 30 MG: 10 INJECTION INTRAMUSCULAR; INTRAVENOUS at 08:05

## 2024-12-03 RX ADMIN — INDOCYANINE GREEN AND WATER 2.5 MG: KIT at 06:44

## 2024-12-03 RX ADMIN — PROPOFOL 120 MG: 10 INJECTION, EMULSION INTRAVENOUS at 08:05

## 2024-12-03 RX ADMIN — ROCURONIUM BROMIDE 40 MG: 10 INJECTION, SOLUTION INTRAVENOUS at 08:05

## 2024-12-03 RX ADMIN — ROCURONIUM BROMIDE 10 MG: 10 INJECTION, SOLUTION INTRAVENOUS at 08:35

## 2024-12-03 RX ADMIN — Medication 100 MCG: at 08:21

## 2024-12-03 RX ADMIN — Medication 100 MCG: at 08:27

## 2024-12-03 RX ADMIN — Medication 100 MCG: at 08:23

## 2024-12-03 RX ADMIN — HYDROMORPHONE HYDROCHLORIDE 0.5 MG: 1 INJECTION, SOLUTION INTRAMUSCULAR; INTRAVENOUS; SUBCUTANEOUS at 09:41

## 2024-12-03 RX ADMIN — SUGAMMADEX 350 MG: 100 INJECTION, SOLUTION INTRAVENOUS at 08:44

## 2024-12-03 RX ADMIN — ONDANSETRON 4 MG: 2 INJECTION, SOLUTION INTRAMUSCULAR; INTRAVENOUS at 08:39

## 2024-12-03 RX ADMIN — DEXAMETHASONE SODIUM PHOSPHATE 10 MG: 4 INJECTION, SOLUTION INTRAMUSCULAR; INTRAVENOUS at 08:22

## 2024-12-03 RX ADMIN — OXYCODONE 10 MG: 5 TABLET ORAL at 10:31

## 2024-12-03 RX ADMIN — DIPHENHYDRAMINE HYDROCHLORIDE 12.5 MG: 50 INJECTION INTRAMUSCULAR; INTRAVENOUS at 09:24

## 2024-12-03 RX ADMIN — SODIUM CHLORIDE: 9 INJECTION, SOLUTION INTRAVENOUS at 07:53

## 2024-12-03 RX ADMIN — WATER 2000 MG: 1 INJECTION INTRAMUSCULAR; INTRAVENOUS; SUBCUTANEOUS at 08:11

## 2024-12-03 RX ADMIN — LIDOCAINE HYDROCHLORIDE 100 MG: 20 INJECTION, SOLUTION EPIDURAL; INFILTRATION; INTRACAUDAL; PERINEURAL at 08:05

## 2024-12-03 RX ADMIN — FENTANYL CITRATE 100 MCG: 50 INJECTION, SOLUTION INTRAMUSCULAR; INTRAVENOUS at 08:05

## 2024-12-03 RX ADMIN — KETOROLAC TROMETHAMINE 15 MG: 30 INJECTION, SOLUTION INTRAMUSCULAR at 08:39

## 2024-12-03 RX ADMIN — GLYCOPYRROLATE 0.2 MG: 0.2 INJECTION INTRAMUSCULAR; INTRAVENOUS at 08:35

## 2024-12-03 ASSESSMENT — PAIN DESCRIPTION - PAIN TYPE
TYPE: SURGICAL PAIN
TYPE: ACUTE PAIN

## 2024-12-03 ASSESSMENT — PAIN DESCRIPTION - LOCATION
LOCATION: ABDOMEN

## 2024-12-03 ASSESSMENT — LIFESTYLE VARIABLES: SMOKING_STATUS: 0

## 2024-12-03 ASSESSMENT — PAIN SCALES - GENERAL
PAINLEVEL_OUTOF10: 9
PAINLEVEL_OUTOF10: 6
PAINLEVEL_OUTOF10: 6
PAINLEVEL_OUTOF10: 4
PAINLEVEL_OUTOF10: 4
PAINLEVEL_OUTOF10: 7
PAINLEVEL_OUTOF10: 7
PAINLEVEL_OUTOF10: 3

## 2024-12-03 ASSESSMENT — PAIN DESCRIPTION - DESCRIPTORS
DESCRIPTORS: DISCOMFORT

## 2024-12-03 ASSESSMENT — PAIN DESCRIPTION - FREQUENCY
FREQUENCY: CONTINUOUS
FREQUENCY: CONTINUOUS
FREQUENCY: INTERMITTENT

## 2024-12-03 ASSESSMENT — PAIN DESCRIPTION - ORIENTATION
ORIENTATION: RIGHT

## 2024-12-03 ASSESSMENT — PAIN - FUNCTIONAL ASSESSMENT
PAIN_FUNCTIONAL_ASSESSMENT: ACTIVITIES ARE NOT PREVENTED
PAIN_FUNCTIONAL_ASSESSMENT: ACTIVITIES ARE NOT PREVENTED

## 2024-12-03 ASSESSMENT — PAIN DESCRIPTION - ONSET
ONSET: GRADUAL
ONSET: ON-GOING

## 2024-12-03 NOTE — H&P
Grandmother     Other Father         MULTIPLE MYELOMA    Migraines Mother     Hypertension Brother     Diabetes Brother         REVIEW OF SYSTEMS:  The chart was reviewed.     PHYSICAL EXAM:    Vitals:    12/03/24 0620   BP: (!) 141/67   Pulse: 67   Resp: 16   Temp: 98 °F (36.7 °C)   SpO2: 95%        CONSTITUTIONAL:  awake, alert, cooperative, no apparent distress, and appears stated age  NECK:  supple, symmetrical, trachea midline  LUNGS:  no increased work of breathing, good resp excursion  CARDIOVASCULAR:  regular rate and rhythm   ABDOMEN:  soft, non-distended and non-tender  EXTREMITIES: moving all extremities     LABS:    CBC  Recent Labs     12/02/24  1119   WBC 6.1   HGB 13.4   HCT 43.8        BMP  Recent Labs     12/02/24  1119      K 4.6      CO2 29   BUN 13   CREATININE 1.0   CALCIUM 9.8     Liver Function  Recent Labs     12/02/24  1119   BILITOT 0.4   AST 21   ALT 19   ALKPHOS 96     No results for input(s): \"LACTATE\" in the last 72 hours.  No results for input(s): \"INR\" in the last 72 hours.    Invalid input(s): \"PT\", \"PTT\"        Electronically signed by Merna Fuchs DO on 12/3/2024 at 6:30 AM

## 2024-12-03 NOTE — OP NOTE
Operative Note    Tk Campbell     DATE OF PROCEDURE: 12/3/2024    SURGEON: Surgeon(s):  Cristela Cook MD    PREOPERATIVE DIAGNOSIS: Biliary colic    POSTOPERATIVE DIAGNOSIS: Chronic cholecystitis    OPERATION: Robotic Assisted Laparoscopic cholecystectomy with indocyanine green (ICG) cholangiography    ANESTHESIA: General endotracheal.     ESTIMATED BLOOD LOSS: minimal    SPECIMEN: Gallbladder    COMPLICATIONS: None.     BRIEF HISTORY: Tk Campbell is a 54 y.o.  female who presented with RUQ pain. I discussed the procedure with the patient, including the procedure, benefits, risks, and alternatives. She agreed.  ICG was given in preoperative holding prior to the procedure.    PROCEDURE: The patient was taken to the operative suite and was placed on the table in the supine position. General endotracheal anesthesia was administered. An orogastric tube was placed to decompress the stomach. The abdomen was then prepped with Chloraprep and draped in a sterile fashion.     An 8 mm incision was made at Briones's point with an 11-blade scalpel, and then while tenting the abdominal wall upward, a Veress needle was easily inserted through the abdominal cavity. After confirmation of its placement using the saline drop test, a total of approximately 3 L of carbon dioxide was insufflated, creating a pneumoperitoneum. The Veress needle was removed, and an 8 mm trocar was inserted while tenting the abdominal wall upward. A prepared laparoscope was inserted, and the right upper quadrant was visualized. An 8-mm port was placed in the supraumbilical position, another two 8 mm ports were placed in the RLQ. There was no injury from port placement.    The robot was then placed over the patient and the ports docked. I then broke scrub and began the case at the surgeon console. The robotic scope was introduced into the abdomen and two Cadiere graspers were placed in arms 1 and 2 under direct vision. A hook  was then placed in arm 4 also under direct vision.     The gallbladder was grasped at the fundus and was inverted toward the left shoulder. The Cadiere was then used to grasp the infundibulum. Any adhesions between the duodenum and the surface of the gallbladder were dissected with the hook to expose the structures in the triangle of Calot and mobilize the duodenum away to protect it from injury. The gallbladder appeared chronically inflamed with a thickened gallbladder wall. The peritoneum over the triangle of Calot was opened using hook cautery until all fat and fibrous tissue was cleared from the hepatocystic triangle. The cystic duct and artery were dissected free and identified. The lower 1/3 of the gallbladder was also  from the liver to expose the cystic plate and only two structures were seen entering the gallbladder. The critical view of safety was obtained at this point with the cystic duct and artery identified as well as the inferior edge of the liver. ICG cholangiography using firefly technology was used to evaluate the biliary anatomy and protect it from injury. The cystic duct was then clipped proximally with three clips and singly clipped distally, and divided sharply with scissors between the clips. The cystic artery was identified immediately posteromedial to the duct and was singly clipped proximally, and divided with the hook cautery. The hook cautery was then used to excise the gallbladder from the liver surface all the way up to the fundus until it was completely removed from the liver bed. No bleeding was seen. The gallbladder was placed in an EndoCatch bag passed through the LUQ port, it was delivered through the incision and off the field.     The right upper quadrant was visualized. There was good hemostasis of the liver bed. The LUQ port site was closed with a 2-0 Vicryl stitch using the Victor Manuel Jude device. All ports were then removed under direct visualization with no

## 2024-12-03 NOTE — ANESTHESIA POSTPROCEDURE EVALUATION
Department of Anesthesiology  Postprocedure Note    Patient: Tk Campbell  MRN: [id number]  YOB: 1970  Date of evaluation: 12/3/2024    Procedure Summary       Date: 12/03/24 Room / Location: [address]    Anesthesia Start: 0753 Anesthesia Stop: 0905    Procedure: LAPAROSCOPIC ROBOTIC XI ASSISTED CHOLECYSTECTOMY Diagnosis:       Biliary colic      (Biliary colic [K80.50])    Surgeons: Cristela Cook MD Responsible Provider: Raji Galvan MD    Anesthesia Type: General ASA Status: 2            Anesthesia Type: General    Elizabeth Phase I: Elizabeth Score: 10    Elizabeth Phase II: Elizabeth Score: 10    Anesthesia Post Evaluation    Patient location during evaluation: PACU  Patient participation: complete - patient participated  Level of consciousness: awake and alert  Airway patency: patent  Nausea & Vomiting: no nausea and no vomiting  Cardiovascular status: hemodynamically stable  Respiratory status: acceptable  Hydration status: euvolemic  Multimodal analgesia pain management approach  Pain management: adequate    No notable events documented.

## 2024-12-03 NOTE — ANESTHESIA PRE PROCEDURE
Department of Anesthesiology  Preprocedure Note       Name:  Tk Campbell   Age:  54 y.o.  :  1970                                          MRN:  [id number]         Date:  12/3/2024      Surgeon: Surgeon(s):  Cristela Cook MD    Procedure: Procedure(s):  LAPAROSCOPIC ROBOTIC XI ASSISTED CHOLECYSTECTOMY POSSIBLE OPEN POSSIBLE GRAM    Medications prior to admission:   Prior to Admission medications    Medication Sig Start Date End Date Taking? Authorizing Provider   montelukast (SINGULAIR) 10 MG tablet TAKE 1 TABLET NIGHTLY 24  Yes Ahmet Harrison DO   rosuvastatin (CRESTOR) 5 MG tablet TAKE 1 TABLET DAILY 24  Yes Ahmet Harrison,    buPROPion (WELLBUTRIN XL) 300 MG extended release tablet TAKE 1 TABLET EVERY MORNING 24  Yes Ahmet Harrison DO   citalopram (CELEXA) 20 MG tablet TAKE 1 TABLET DAILY 24  Yes Ahmet Harrison DO   ondansetron (ZOFRAN) 4 MG tablet Take 1 tablet by mouth daily as needed for Nausea or Vomiting 23  Yes Rachel Mendoza, DO   diphenhydrAMINE HCl (BENADRYL ALLERGY PO) as needed 21  Yes ProviderRodríguez MD   diphenhydrAMINE-APAP, sleep, (TYLENOL PM EXTRA STRENGTH PO) Take by mouth as needed 21  Yes ProviderRodírguez MD   Cetirizine HCl (ZYRTEC PO) Take 10 mg by mouth daily   Yes ProviderRodríguez MD   fluticasone (FLONASE) 50 MCG/ACT nasal spray 1 spray by Nasal route daily   Yes Rodríguez Peñaloza MD   omeprazole (PRILOSEC) 20 MG delayed release capsule Take 1 capsule by mouth every morning (before breakfast) 24  Cristela Cook MD       Current medications:    Current Facility-Administered Medications   Medication Dose Route Frequency Provider Last Rate Last Admin    sodium chloride flush 0.9 % injection 5-40 mL  5-40 mL IntraVENous 2 times per day Cristela Cook MD        sodium chloride flush 0.9 % injection 5-40 mL  5-40 mL IntraVENous PRN 
COVID19 Detected 12/03/2021 11:01 AM           Anesthesia Evaluation  Patient summary reviewed and Nursing notes reviewed   no history of anesthetic complications:   Airway: Mallampati: III  TM distance: >3 FB   Neck ROM: full  Mouth opening: > = 3 FB   Dental:    (+) caps  Comment: Denies any missing, loose, chipped teeth    Pulmonary: breath sounds clear to auscultation      (-) not a current smoker          Patient did not smoke on day of surgery.                 Cardiovascular:  Exercise tolerance: good (>4 METS)  (+) hyperlipidemia      ECG reviewed  Rhythm: regular  Rate: normal           Beta Blocker:  Not on Beta Blocker         Neuro/Psych:   (+) depression/anxiety             GI/Hepatic/Renal:   (+) hiatal hernia, GERD: well controlled          Endo/Other: Negative Endo/Other ROS                    Abdominal:             Vascular: negative vascular ROS.         Other Findings:             Anesthesia Plan      general     ASA 2       Induction: intravenous.  BIS  MIPS: Postoperative opioids intended.  Anesthetic plan and risks discussed with patient.    Use of blood products discussed with patient whom consented to blood products.    Plan discussed with attending.                    Amparo Saenz RN   12/3/2024

## 2024-12-06 LAB — SURGICAL PATHOLOGY REPORT: NORMAL

## 2024-12-19 ENCOUNTER — OFFICE VISIT (OUTPATIENT)
Dept: SURGERY | Age: 54
End: 2024-12-19

## 2024-12-19 VITALS
BODY MASS INDEX: 28.25 KG/M2 | HEART RATE: 76 BPM | TEMPERATURE: 97.5 F | RESPIRATION RATE: 18 BRPM | SYSTOLIC BLOOD PRESSURE: 116 MMHG | OXYGEN SATURATION: 97 % | HEIGHT: 68 IN | DIASTOLIC BLOOD PRESSURE: 81 MMHG | WEIGHT: 186.38 LBS

## 2024-12-19 DIAGNOSIS — G89.18 POST-OP PAIN: Primary | ICD-10-CM

## 2024-12-19 PROCEDURE — 99024 POSTOP FOLLOW-UP VISIT: CPT | Performed by: SURGERY

## 2024-12-19 RX ORDER — COLESTIPOL HYDROCHLORIDE 1 G/1
1 TABLET ORAL 2 TIMES DAILY
Qty: 60 TABLET | Refills: 3 | Status: SHIPPED | OUTPATIENT
Start: 2024-12-19

## 2024-12-19 NOTE — PROGRESS NOTES
Progress Note - Post-op follow up     Patient's Name/Date of Birth: Tk Campbell / 1970    Date: 12/19/2024    PCP: Ahemt Harrison DO    Referring Physician:   Ahmet Harrison*  [phone]    Chief Complaint   Patient presents with    Post-Op Check     POST OP - lap wendy post op  Patient has slight discomfort.       Subjective:  Patient presents to the office following surgery. The patient is tolerating a regular diet. Denies n/v/c. Pain controlled with pain medication. The patient has been eating bland foods and is having diarrhea with every meal.     Patient's medications, allergies, past medical, surgical, social and family histories were reviewed and updated as appropriate.    Physical Exam:  /81   Pulse 76   Temp 97.5 °F (36.4 °C)   Resp 18   Ht 1.727 m (5' 8\")   Wt 84.5 kg (186 lb 6 oz)   LMP  (LMP Unknown) Comment: hysterectomy  SpO2 97%   BMI 28.34 kg/m²   General Appearance: NAD  Abdomen: soft, non-distended mild incisional tenderness, no rebound or guarding, incision C/D/I    Data Reviewed: Pathology reviewed with patient  Path Number: [id number]     -- Diagnosis --   Gallbladder: Chronic cholecystitis     Assessment/Plan:    54 y.o. year old female s/p Robotic Assisted Laparoscopic cholecystectomy     Patient recovering well from surgery  Wound care discussed  Colestipol  Follow up PRN     Cristela Cook MD 12/19/2024 12:31 PM     Send copy of H&P to PCP, Ahmet Harrison DO and referring physician, Ahmet Harrison*

## 2025-01-07 RX ORDER — CITALOPRAM HYDROBROMIDE 20 MG/1
20 TABLET ORAL DAILY
Qty: 90 TABLET | Refills: 1 | Status: SHIPPED | OUTPATIENT
Start: 2025-01-07

## 2025-01-30 DIAGNOSIS — E78.00 PURE HYPERCHOLESTEROLEMIA: ICD-10-CM

## 2025-01-30 RX ORDER — ROSUVASTATIN CALCIUM 5 MG/1
5 TABLET, COATED ORAL DAILY
Qty: 90 TABLET | Refills: 1 | Status: SHIPPED | OUTPATIENT
Start: 2025-01-30

## 2025-05-12 RX ORDER — MONTELUKAST SODIUM 10 MG/1
10 TABLET ORAL
Qty: 90 TABLET | Refills: 1 | Status: SHIPPED | OUTPATIENT
Start: 2025-05-12

## 2025-05-12 NOTE — TELEPHONE ENCOUNTER
Patients last appointment 12/2/2024.  Patients next scheduled appointment   Future Appointments   Date Time Provider Department Center   6/3/2025  3:30 PM Ahmet Harrison DO [facility]   8/25/2025  9:10 AM Soni Pittman, APRN - CNP [facility]

## 2025-05-30 SDOH — ECONOMIC STABILITY: INCOME INSECURITY: IN THE LAST 12 MONTHS, WAS THERE A TIME WHEN YOU WERE NOT ABLE TO PAY THE MORTGAGE OR RENT ON TIME?: NO

## 2025-05-30 SDOH — ECONOMIC STABILITY: FOOD INSECURITY: WITHIN THE PAST 12 MONTHS, YOU WORRIED THAT YOUR FOOD WOULD RUN OUT BEFORE YOU GOT MONEY TO BUY MORE.: NEVER TRUE

## 2025-05-30 SDOH — ECONOMIC STABILITY: TRANSPORTATION INSECURITY
IN THE PAST 12 MONTHS, HAS LACK OF TRANSPORTATION KEPT YOU FROM MEETINGS, WORK, OR FROM GETTING THINGS NEEDED FOR DAILY LIVING?: NO

## 2025-05-30 SDOH — ECONOMIC STABILITY: FOOD INSECURITY: WITHIN THE PAST 12 MONTHS, THE FOOD YOU BOUGHT JUST DIDN'T LAST AND YOU DIDN'T HAVE MONEY TO GET MORE.: NEVER TRUE

## 2025-05-30 SDOH — ECONOMIC STABILITY: TRANSPORTATION INSECURITY
IN THE PAST 12 MONTHS, HAS THE LACK OF TRANSPORTATION KEPT YOU FROM MEDICAL APPOINTMENTS OR FROM GETTING MEDICATIONS?: NO

## 2025-05-30 ASSESSMENT — PATIENT HEALTH QUESTIONNAIRE - PHQ9
9. THOUGHTS THAT YOU WOULD BE BETTER OFF DEAD, OR OF HURTING YOURSELF: NOT AT ALL
8. MOVING OR SPEAKING SO SLOWLY THAT OTHER PEOPLE COULD HAVE NOTICED. OR THE OPPOSITE, BEING SO FIGETY OR RESTLESS THAT YOU HAVE BEEN MOVING AROUND A LOT MORE THAN USUAL: NOT AT ALL
5. POOR APPETITE OR OVEREATING: NOT AT ALL
SUM OF ALL RESPONSES TO PHQ QUESTIONS 1-9: 2
2. FEELING DOWN, DEPRESSED OR HOPELESS: NOT AT ALL
9. THOUGHTS THAT YOU WOULD BE BETTER OFF DEAD, OR OF HURTING YOURSELF: NOT AT ALL
7. TROUBLE CONCENTRATING ON THINGS, SUCH AS READING THE NEWSPAPER OR WATCHING TELEVISION: NOT AT ALL
7. TROUBLE CONCENTRATING ON THINGS, SUCH AS READING THE NEWSPAPER OR WATCHING TELEVISION: NOT AT ALL
10. IF YOU CHECKED OFF ANY PROBLEMS, HOW DIFFICULT HAVE THESE PROBLEMS MADE IT FOR YOU TO DO YOUR WORK, TAKE CARE OF THINGS AT HOME, OR GET ALONG WITH OTHER PEOPLE: NOT DIFFICULT AT ALL
6. FEELING BAD ABOUT YOURSELF - OR THAT YOU ARE A FAILURE OR HAVE LET YOURSELF OR YOUR FAMILY DOWN: NOT AT ALL
3. TROUBLE FALLING OR STAYING ASLEEP: SEVERAL DAYS
1. LITTLE INTEREST OR PLEASURE IN DOING THINGS: NOT AT ALL
2. FEELING DOWN, DEPRESSED OR HOPELESS: NOT AT ALL
6. FEELING BAD ABOUT YOURSELF - OR THAT YOU ARE A FAILURE OR HAVE LET YOURSELF OR YOUR FAMILY DOWN: NOT AT ALL
4. FEELING TIRED OR HAVING LITTLE ENERGY: SEVERAL DAYS
1. LITTLE INTEREST OR PLEASURE IN DOING THINGS: NOT AT ALL
10. IF YOU CHECKED OFF ANY PROBLEMS, HOW DIFFICULT HAVE THESE PROBLEMS MADE IT FOR YOU TO DO YOUR WORK, TAKE CARE OF THINGS AT HOME, OR GET ALONG WITH OTHER PEOPLE: NOT DIFFICULT AT ALL
8. MOVING OR SPEAKING SO SLOWLY THAT OTHER PEOPLE COULD HAVE NOTICED. OR THE OPPOSITE - BEING SO FIDGETY OR RESTLESS THAT YOU HAVE BEEN MOVING AROUND A LOT MORE THAN USUAL: NOT AT ALL
SUM OF ALL RESPONSES TO PHQ QUESTIONS 1-9: 2
4. FEELING TIRED OR HAVING LITTLE ENERGY: SEVERAL DAYS
SUM OF ALL RESPONSES TO PHQ QUESTIONS 1-9: 2
3. TROUBLE FALLING OR STAYING ASLEEP: SEVERAL DAYS
5. POOR APPETITE OR OVEREATING: NOT AT ALL

## 2025-06-02 ENCOUNTER — OFFICE VISIT (OUTPATIENT)
Dept: PRIMARY CARE CLINIC | Age: 55
End: 2025-06-02
Payer: COMMERCIAL

## 2025-06-02 VITALS
HEART RATE: 83 BPM | WEIGHT: 193 LBS | DIASTOLIC BLOOD PRESSURE: 64 MMHG | TEMPERATURE: 97.3 F | BODY MASS INDEX: 29.25 KG/M2 | OXYGEN SATURATION: 98 % | HEIGHT: 68 IN | SYSTOLIC BLOOD PRESSURE: 110 MMHG

## 2025-06-02 DIAGNOSIS — E78.00 PURE HYPERCHOLESTEROLEMIA: Primary | ICD-10-CM

## 2025-06-02 DIAGNOSIS — J30.89 NON-SEASONAL ALLERGIC RHINITIS DUE TO OTHER ALLERGIC TRIGGER: ICD-10-CM

## 2025-06-02 DIAGNOSIS — F34.1 DYSTHYMIA: ICD-10-CM

## 2025-06-02 PROCEDURE — 99213 OFFICE O/P EST LOW 20 MIN: CPT | Performed by: FAMILY MEDICINE

## 2025-06-02 PROCEDURE — G8419 CALC BMI OUT NRM PARAM NOF/U: HCPCS | Performed by: FAMILY MEDICINE

## 2025-06-02 PROCEDURE — G8427 DOCREV CUR MEDS BY ELIG CLIN: HCPCS | Performed by: FAMILY MEDICINE

## 2025-06-02 PROCEDURE — 3017F COLORECTAL CA SCREEN DOC REV: CPT | Performed by: FAMILY MEDICINE

## 2025-06-02 PROCEDURE — 1036F TOBACCO NON-USER: CPT | Performed by: FAMILY MEDICINE

## 2025-06-02 ASSESSMENT — ENCOUNTER SYMPTOMS
CONSTIPATION: 0
ABDOMINAL PAIN: 0
SHORTNESS OF BREATH: 0
DIARRHEA: 0

## 2025-06-02 NOTE — PROGRESS NOTES
Tk Campbell, a female of 54 y.o. came to the office 6/2/2025.     Patient Active Problem List   Diagnosis    Chronic non-seasonal allergic rhinitis    Dysthymia    Pure hypercholesterolemia    RUQ pain    Biliary colic          Hyperlipidemia  This is a chronic problem. The current episode started more than 1 year ago. The problem is controlled. Pertinent negatives include no chest pain, leg pain, myalgias or shortness of breath. Current antihyperlipidemic treatment includes statins. The current treatment provides moderate improvement of lipids. There are no compliance problems.    Depression  Visit Type: follow-up  Patient presents with the following symptoms: decreased concentration.  Patient is not experiencing: depressed mood, excessive worry, feelings of hopelessness, insomnia, irritability, nervousness/anxiety, palpitations, psychomotor agitation and shortness of breath.      Allergic Rhinitis   Presents for follow-up (contgrolled with meds) visit. She complains of congestion and fatigue.        No Known Allergies    Current Outpatient Medications on File Prior to Visit   Medication Sig Dispense Refill    montelukast (SINGULAIR) 10 MG tablet TAKE 1 TABLET NIGHTLY 90 tablet 1    rosuvastatin (CRESTOR) 5 MG tablet TAKE 1 TABLET DAILY 90 tablet 1    citalopram (CELEXA) 20 MG tablet TAKE 1 TABLET DAILY 90 tablet 1    colestipol (COLESTID) 1 g tablet Take 1 tablet by mouth 2 times daily 60 tablet 3    omeprazole (PRILOSEC) 20 MG delayed release capsule Take 1 capsule by mouth every morning (before breakfast) 90 capsule 3    buPROPion (WELLBUTRIN XL) 300 MG extended release tablet TAKE 1 TABLET EVERY MORNING 90 tablet 2    ondansetron (ZOFRAN) 4 MG tablet Take 1 tablet by mouth daily as needed for Nausea or Vomiting 30 tablet 0    diphenhydrAMINE HCl (BENADRYL ALLERGY PO) as needed      diphenhydrAMINE-APAP, sleep, (TYLENOL PM EXTRA STRENGTH PO) Take by mouth as needed      Cetirizine HCl (ZYRTEC PO) Take 10

## 2025-07-07 ENCOUNTER — PATIENT MESSAGE (OUTPATIENT)
Dept: PRIMARY CARE CLINIC | Age: 55
End: 2025-07-07

## 2025-07-07 DIAGNOSIS — R11.2 NAUSEA AND VOMITING, UNSPECIFIED VOMITING TYPE: ICD-10-CM

## 2025-07-07 RX ORDER — ONDANSETRON 4 MG/1
4 TABLET, FILM COATED ORAL EVERY 8 HOURS PRN
Qty: 30 TABLET | Refills: 0 | Status: SHIPPED | OUTPATIENT
Start: 2025-07-07

## 2025-07-07 RX ORDER — ONDANSETRON 4 MG/1
4 TABLET, FILM COATED ORAL DAILY PRN
Qty: 30 TABLET | Refills: 0 | OUTPATIENT
Start: 2025-07-07

## 2025-07-07 RX ORDER — CITALOPRAM HYDROBROMIDE 20 MG/1
20 TABLET ORAL DAILY
Qty: 90 TABLET | Refills: 1 | Status: SHIPPED | OUTPATIENT
Start: 2025-07-07

## 2025-07-07 NOTE — TELEPHONE ENCOUNTER
Patients last appointment 6/2/2025.  Patients next scheduled appointment   Future Appointments   Date Time Provider Department Center   8/25/2025  9:10 AM Soni Pittman APRN - CNP [facility]   12/4/2025  9:15 AM Ahmet Harrison DO [facility]

## 2025-07-28 ENCOUNTER — OFFICE VISIT (OUTPATIENT)
Dept: PRIMARY CARE CLINIC | Age: 55
End: 2025-07-28
Payer: COMMERCIAL

## 2025-07-28 VITALS
SYSTOLIC BLOOD PRESSURE: 140 MMHG | HEIGHT: 67 IN | HEART RATE: 91 BPM | RESPIRATION RATE: 16 BRPM | WEIGHT: 190 LBS | DIASTOLIC BLOOD PRESSURE: 82 MMHG | BODY MASS INDEX: 29.82 KG/M2 | OXYGEN SATURATION: 95 % | TEMPERATURE: 97.5 F

## 2025-07-28 DIAGNOSIS — J01.20 ACUTE NON-RECURRENT ETHMOIDAL SINUSITIS: Primary | ICD-10-CM

## 2025-07-28 PROCEDURE — G8419 CALC BMI OUT NRM PARAM NOF/U: HCPCS | Performed by: FAMILY MEDICINE

## 2025-07-28 PROCEDURE — 99213 OFFICE O/P EST LOW 20 MIN: CPT | Performed by: FAMILY MEDICINE

## 2025-07-28 PROCEDURE — 3017F COLORECTAL CA SCREEN DOC REV: CPT | Performed by: FAMILY MEDICINE

## 2025-07-28 PROCEDURE — G8427 DOCREV CUR MEDS BY ELIG CLIN: HCPCS | Performed by: FAMILY MEDICINE

## 2025-07-28 PROCEDURE — 1036F TOBACCO NON-USER: CPT | Performed by: FAMILY MEDICINE

## 2025-07-28 RX ORDER — AMOXICILLIN 500 MG/1
500 CAPSULE ORAL 3 TIMES DAILY
Qty: 30 CAPSULE | Refills: 0 | Status: SHIPPED | OUTPATIENT
Start: 2025-07-28 | End: 2025-08-07

## 2025-07-28 ASSESSMENT — ENCOUNTER SYMPTOMS
SORE THROAT: 0
SINUS PRESSURE: 1
SHORTNESS OF BREATH: 0
SINUS PAIN: 1
RHINORRHEA: 0
COUGH: 0

## 2025-07-28 NOTE — PROGRESS NOTES
Tk Campbell, a female of 54 y.o. came to the office 7/28/2025.     Patient Active Problem List   Diagnosis    Chronic non-seasonal allergic rhinitis    Dysthymia    Pure hypercholesterolemia    RUQ pain    Biliary colic          Cold Symptoms   The current episode started 1 to 4 weeks ago. The problem has been gradually worsening. There has been no fever. Associated symptoms include congestion, headaches and sinus pain. Pertinent negatives include no coughing, ear pain, rhinorrhea or sore throat. She has tried antihistamine (Flonase, Zyrtec D, and ibu) for the symptoms. The treatment provided mild relief.        No Known Allergies    Current Outpatient Medications on File Prior to Visit   Medication Sig Dispense Refill    citalopram (CELEXA) 20 MG tablet TAKE 1 TABLET DAILY 90 tablet 1    ondansetron (ZOFRAN) 4 MG tablet Take 1 tablet by mouth every 8 hours as needed for Nausea or Vomiting 30 tablet 0    montelukast (SINGULAIR) 10 MG tablet TAKE 1 TABLET NIGHTLY 90 tablet 1    rosuvastatin (CRESTOR) 5 MG tablet TAKE 1 TABLET DAILY 90 tablet 1    colestipol (COLESTID) 1 g tablet Take 1 tablet by mouth 2 times daily 60 tablet 3    buPROPion (WELLBUTRIN XL) 300 MG extended release tablet TAKE 1 TABLET EVERY MORNING 90 tablet 2    diphenhydrAMINE HCl (BENADRYL ALLERGY PO) as needed      diphenhydrAMINE-APAP, sleep, (TYLENOL PM EXTRA STRENGTH PO) Take by mouth as needed      Cetirizine HCl (ZYRTEC PO) Take 10 mg by mouth daily      fluticasone (FLONASE) 50 MCG/ACT nasal spray 1 spray by Nasal route daily      omeprazole (PRILOSEC) 20 MG delayed release capsule Take 1 capsule by mouth every morning (before breakfast) 90 capsule 3     No current facility-administered medications on file prior to visit.       Review of Systems   Constitutional:  Negative for chills and fever.   HENT:  Positive for congestion, postnasal drip, sinus pressure and sinus pain. Negative for ear pain, rhinorrhea and sore throat.

## 2025-07-29 DIAGNOSIS — E78.00 PURE HYPERCHOLESTEROLEMIA: ICD-10-CM

## 2025-07-29 RX ORDER — ROSUVASTATIN CALCIUM 5 MG/1
5 TABLET, COATED ORAL DAILY
Qty: 90 TABLET | Refills: 1 | Status: SHIPPED | OUTPATIENT
Start: 2025-07-29

## 2025-07-29 NOTE — TELEPHONE ENCOUNTER
Patients last appointment 7/28/2025.  Patients next scheduled appointment   Future Appointments   Date Time Provider Department Center   8/25/2025  9:10 AM Soni Pittman APRN - CNP [facility]   12/4/2025  9:15 AM Ahmet Harrison DO [facility]

## (undated) DEVICE — KIT,ANTI FOG,W/SPONGE & FLUID,SOFT PACK: Brand: MEDLINE

## (undated) DEVICE — SPONGE GZ W4XL4IN RAYON POLY CVR W/NONWOVEN FAB STRL 2/PK

## (undated) DEVICE — INSUFFLATION NEEDLE TO ESTABLISH PNEUMOPERITONEUM.: Brand: INSUFFLATION NEEDLE

## (undated) DEVICE — CADIERE FORCEPS: Brand: ENDOWRIST

## (undated) DEVICE — INSUFFLATION TUBING SET WITH FILTER, FUNNEL CONNECTOR AND LUER LOCK: Brand: JOSNOE MEDICAL INC

## (undated) DEVICE — TOWEL,OR,DSP,ST,BLUE,STD,6/PK,12PK/CS: Brand: MEDLINE

## (undated) DEVICE — BLADELESS OBTURATOR: Brand: WECK VISTA

## (undated) DEVICE — WARMER SCP 2 ANTIFOG LAP DISP

## (undated) DEVICE — SEAL

## (undated) DEVICE — MEDIUM-LARGE CLIP APPLIER: Brand: ENDOWRIST

## (undated) DEVICE — SYRINGE 20ML LL S/C 50

## (undated) DEVICE — GLOVE SURG SZ 6 L12IN THK75MIL DK GRN LTX FREE

## (undated) DEVICE — GRADUATE TRIANG MEASURE 1000ML BLK PRNT

## (undated) DEVICE — SOLUTION IRRIG 1000ML 0.9% SOD CHL USP POUR PLAS BTL

## (undated) DEVICE — ENDOSCOPIC KIT CLN SWAB MICROFIBER CLTH SCP CLEANOR DISP

## (undated) DEVICE — APPLICATOR MEDICATED 26 CC SOLUTION HI LT ORNG CHLORAPREP

## (undated) DEVICE — COLUMN DRAPE

## (undated) DEVICE — Z INACTIVE USE 2855131 SPONGE GZ W4XL4IN RAYON POLY FILL CVR W/ NONWOVEN FAB

## (undated) DEVICE — PERMANENT CAUTERY HOOK: Brand: ENDOWRIST

## (undated) DEVICE — Device

## (undated) DEVICE — ROUND TIP SCISSORS: Brand: ENDOWRIST

## (undated) DEVICE — DRAPE,LAP,CHOLE,W/TROUGHS,STERILE: Brand: MEDLINE

## (undated) DEVICE — BLADE,STAINLESS-STEEL,11,STRL,DISPOSABLE: Brand: MEDLINE

## (undated) DEVICE — BLOCK BITE 60FR RUBBER ADLT DENTAL

## (undated) DEVICE — ELECTRODE PT RET AD L9FT HI MOIST COND ADH HYDRGEL CORDED

## (undated) DEVICE — ANCHOR TISSUE RETRIEVAL SYSTEM, BAG SIZE 125 ML, PORT SIZE 8 MM: Brand: ANCHOR TISSUE RETRIEVAL SYSTEM

## (undated) DEVICE — NEEDLE CLOSURE OMNICLOSE

## (undated) DEVICE — GOWN,SIRUS,FABRNF,L,20/CS: Brand: MEDLINE

## (undated) DEVICE — GOWN,SIRUS,FABRNF,XL,20/CS: Brand: MEDLINE

## (undated) DEVICE — LIQUIBAND RAPID ADHESIVE 36/CS 0.8ML: Brand: MEDLINE

## (undated) DEVICE — FORCEPS BX OVL CUP FEN DISPOSABLE CAP L 160CM RAD JAW 4

## (undated) DEVICE — ARM DRAPE

## (undated) DEVICE — DOUBLE BASIN SET: Brand: MEDLINE INDUSTRIES, INC.